# Patient Record
Sex: FEMALE | Race: WHITE | Employment: OTHER | URBAN - METROPOLITAN AREA
[De-identification: names, ages, dates, MRNs, and addresses within clinical notes are randomized per-mention and may not be internally consistent; named-entity substitution may affect disease eponyms.]

---

## 2018-04-13 ENCOUNTER — TELEPHONE (OUTPATIENT)
Dept: ORTHOPEDIC SURGERY | Age: 83
End: 2018-04-13

## 2019-02-06 ENCOUNTER — APPOINTMENT (OUTPATIENT)
Dept: CT IMAGING | Age: 84
End: 2019-02-06
Payer: MEDICARE

## 2019-02-06 ENCOUNTER — APPOINTMENT (OUTPATIENT)
Dept: GENERAL RADIOLOGY | Age: 84
End: 2019-02-06
Payer: MEDICARE

## 2019-02-06 ENCOUNTER — HOSPITAL ENCOUNTER (OUTPATIENT)
Age: 84
Setting detail: OBSERVATION
Discharge: HOME | End: 2019-02-08
Attending: EMERGENCY MEDICINE | Admitting: HOSPITALIST
Payer: MEDICARE

## 2019-02-06 DIAGNOSIS — G45.9 TIA (TRANSIENT ISCHEMIC ATTACK): Primary | ICD-10-CM

## 2019-02-06 LAB
A/G RATIO: 1.6 (ref 1.1–2.2)
ALBUMIN SERPL-MCNC: 4.5 G/DL (ref 3.4–5)
ALP BLD-CCNC: 64 U/L (ref 40–129)
ALT SERPL-CCNC: 10 U/L (ref 10–40)
ANION GAP SERPL CALCULATED.3IONS-SCNC: 15 MMOL/L (ref 3–16)
AST SERPL-CCNC: 18 U/L (ref 15–37)
BASOPHILS ABSOLUTE: 0 K/UL (ref 0–0.2)
BASOPHILS RELATIVE PERCENT: 0.8 %
BILIRUB SERPL-MCNC: 0.4 MG/DL (ref 0–1)
BILIRUBIN URINE: NEGATIVE
BLOOD, URINE: NEGATIVE
BUN BLDV-MCNC: 16 MG/DL (ref 7–20)
CALCIUM SERPL-MCNC: 9.5 MG/DL (ref 8.3–10.6)
CHLORIDE BLD-SCNC: 106 MMOL/L (ref 99–110)
CLARITY: CLEAR
CO2: 21 MMOL/L (ref 21–32)
COLOR: YELLOW
CREAT SERPL-MCNC: 0.8 MG/DL (ref 0.6–1.2)
EOSINOPHILS ABSOLUTE: 0.2 K/UL (ref 0–0.6)
EOSINOPHILS RELATIVE PERCENT: 3.9 %
GFR AFRICAN AMERICAN: >60
GFR NON-AFRICAN AMERICAN: >60
GLOBULIN: 2.9 G/DL
GLUCOSE BLD-MCNC: 104 MG/DL (ref 70–99)
GLUCOSE BLD-MCNC: 108 MG/DL (ref 70–99)
GLUCOSE URINE: NEGATIVE MG/DL
HCT VFR BLD CALC: 40.1 % (ref 36–48)
HEMOGLOBIN: 13.8 G/DL (ref 12–16)
KETONES, URINE: NEGATIVE MG/DL
LEUKOCYTE ESTERASE, URINE: NEGATIVE
LYMPHOCYTES ABSOLUTE: 1.6 K/UL (ref 1–5.1)
LYMPHOCYTES RELATIVE PERCENT: 27.3 %
MCH RBC QN AUTO: 29.5 PG (ref 26–34)
MCHC RBC AUTO-ENTMCNC: 34.4 G/DL (ref 31–36)
MCV RBC AUTO: 85.8 FL (ref 80–100)
MICROSCOPIC EXAMINATION: NORMAL
MONOCYTES ABSOLUTE: 0.4 K/UL (ref 0–1.3)
MONOCYTES RELATIVE PERCENT: 6 %
NEUTROPHILS ABSOLUTE: 3.6 K/UL (ref 1.7–7.7)
NEUTROPHILS RELATIVE PERCENT: 62 %
NITRITE, URINE: NEGATIVE
PDW BLD-RTO: 13.7 % (ref 12.4–15.4)
PERFORMED ON: ABNORMAL
PH UA: 7.5
PLATELET # BLD: 179 K/UL (ref 135–450)
PMV BLD AUTO: 8.6 FL (ref 5–10.5)
POTASSIUM SERPL-SCNC: 3.9 MMOL/L (ref 3.5–5.1)
PRO-BNP: 146 PG/ML (ref 0–449)
PROTEIN UA: NEGATIVE MG/DL
RBC # BLD: 4.68 M/UL (ref 4–5.2)
SODIUM BLD-SCNC: 142 MMOL/L (ref 136–145)
SPECIFIC GRAVITY UA: >1.03
TOTAL PROTEIN: 7.4 G/DL (ref 6.4–8.2)
TROPONIN: <0.01 NG/ML
URINE REFLEX TO CULTURE: NORMAL
URINE TYPE: NORMAL
UROBILINOGEN, URINE: 0.2 E.U./DL
WBC # BLD: 5.9 K/UL (ref 4–11)

## 2019-02-06 PROCEDURE — 2580000003 HC RX 258: Performed by: HOSPITALIST

## 2019-02-06 PROCEDURE — 83880 ASSAY OF NATRIURETIC PEPTIDE: CPT

## 2019-02-06 PROCEDURE — 71045 X-RAY EXAM CHEST 1 VIEW: CPT

## 2019-02-06 PROCEDURE — 70450 CT HEAD/BRAIN W/O DYE: CPT

## 2019-02-06 PROCEDURE — 36415 COLL VENOUS BLD VENIPUNCTURE: CPT

## 2019-02-06 PROCEDURE — 6360000004 HC RX CONTRAST MEDICATION: Performed by: EMERGENCY MEDICINE

## 2019-02-06 PROCEDURE — 85025 COMPLETE CBC W/AUTO DIFF WBC: CPT

## 2019-02-06 PROCEDURE — 6370000000 HC RX 637 (ALT 250 FOR IP): Performed by: HOSPITALIST

## 2019-02-06 PROCEDURE — G0378 HOSPITAL OBSERVATION PER HR: HCPCS

## 2019-02-06 PROCEDURE — 84484 ASSAY OF TROPONIN QUANT: CPT

## 2019-02-06 PROCEDURE — 70498 CT ANGIOGRAPHY NECK: CPT

## 2019-02-06 PROCEDURE — 80053 COMPREHEN METABOLIC PANEL: CPT

## 2019-02-06 PROCEDURE — 93005 ELECTROCARDIOGRAM TRACING: CPT | Performed by: EMERGENCY MEDICINE

## 2019-02-06 PROCEDURE — 81003 URINALYSIS AUTO W/O SCOPE: CPT

## 2019-02-06 PROCEDURE — 70496 CT ANGIOGRAPHY HEAD: CPT

## 2019-02-06 PROCEDURE — 99285 EMERGENCY DEPT VISIT HI MDM: CPT

## 2019-02-06 PROCEDURE — 93010 ELECTROCARDIOGRAM REPORT: CPT | Performed by: INTERNAL MEDICINE

## 2019-02-06 RX ORDER — SODIUM CHLORIDE 0.9 % (FLUSH) 0.9 %
10 SYRINGE (ML) INJECTION PRN
Status: DISCONTINUED | OUTPATIENT
Start: 2019-02-06 | End: 2019-02-08 | Stop reason: HOSPADM

## 2019-02-06 RX ORDER — ZOLPIDEM TARTRATE 5 MG/1
5 TABLET ORAL NIGHTLY PRN
Status: DISCONTINUED | OUTPATIENT
Start: 2019-02-06 | End: 2019-02-08 | Stop reason: HOSPADM

## 2019-02-06 RX ORDER — ALBUTEROL SULFATE 90 UG/1
2 AEROSOL, METERED RESPIRATORY (INHALATION) EVERY 6 HOURS PRN
Status: DISCONTINUED | OUTPATIENT
Start: 2019-02-06 | End: 2019-02-08 | Stop reason: HOSPADM

## 2019-02-06 RX ORDER — POLYETHYLENE GLYCOL 3350 17 G/17G
17 POWDER, FOR SOLUTION ORAL DAILY
Status: DISCONTINUED | OUTPATIENT
Start: 2019-02-06 | End: 2019-02-08 | Stop reason: HOSPADM

## 2019-02-06 RX ORDER — ASPIRIN 81 MG/1
81 TABLET ORAL DAILY
Status: DISCONTINUED | OUTPATIENT
Start: 2019-02-06 | End: 2019-02-06

## 2019-02-06 RX ORDER — SODIUM CHLORIDE 9 MG/ML
INJECTION, SOLUTION INTRAVENOUS CONTINUOUS
Status: DISCONTINUED | OUTPATIENT
Start: 2019-02-06 | End: 2019-02-07

## 2019-02-06 RX ORDER — ATORVASTATIN CALCIUM 40 MG/1
40 TABLET, FILM COATED ORAL NIGHTLY
Status: DISCONTINUED | OUTPATIENT
Start: 2019-02-06 | End: 2019-02-08 | Stop reason: HOSPADM

## 2019-02-06 RX ORDER — SODIUM CHLORIDE 0.9 % (FLUSH) 0.9 %
10 SYRINGE (ML) INJECTION EVERY 12 HOURS SCHEDULED
Status: DISCONTINUED | OUTPATIENT
Start: 2019-02-06 | End: 2019-02-08 | Stop reason: HOSPADM

## 2019-02-06 RX ORDER — M-VIT,TX,IRON,MINS/CALC/FOLIC 27MG-0.4MG
1 TABLET ORAL DAILY
Status: DISCONTINUED | OUTPATIENT
Start: 2019-02-06 | End: 2019-02-08 | Stop reason: HOSPADM

## 2019-02-06 RX ORDER — ERGOCALCIFEROL 1.25 MG/1
50000 CAPSULE ORAL
Status: DISCONTINUED | OUTPATIENT
Start: 2019-02-08 | End: 2019-02-08 | Stop reason: HOSPADM

## 2019-02-06 RX ORDER — ONDANSETRON 2 MG/ML
4 INJECTION INTRAMUSCULAR; INTRAVENOUS EVERY 6 HOURS PRN
Status: DISCONTINUED | OUTPATIENT
Start: 2019-02-06 | End: 2019-02-08 | Stop reason: HOSPADM

## 2019-02-06 RX ORDER — PANTOPRAZOLE SODIUM 40 MG/1
40 TABLET, DELAYED RELEASE ORAL
Status: DISCONTINUED | OUTPATIENT
Start: 2019-02-07 | End: 2019-02-08 | Stop reason: HOSPADM

## 2019-02-06 RX ORDER — OMEPRAZOLE 40 MG/1
CAPSULE, DELAYED RELEASE ORAL
Refills: 1 | COMMUNITY
Start: 2019-01-15

## 2019-02-06 RX ORDER — ACETAMINOPHEN, ASPIRIN AND CAFFEINE 250; 250; 65 MG/1; MG/1; MG/1
1 TABLET, FILM COATED ORAL DAILY
Status: DISCONTINUED | OUTPATIENT
Start: 2019-02-07 | End: 2019-02-07

## 2019-02-06 RX ADMIN — ZOLPIDEM TARTRATE 5 MG: 5 TABLET ORAL at 23:02

## 2019-02-06 RX ADMIN — IOPAMIDOL 75 ML: 755 INJECTION, SOLUTION INTRAVENOUS at 13:19

## 2019-02-06 RX ADMIN — SODIUM CHLORIDE: 9 INJECTION, SOLUTION INTRAVENOUS at 18:42

## 2019-02-06 ASSESSMENT — PAIN DESCRIPTION - DESCRIPTORS: DESCRIPTORS: NUMBNESS

## 2019-02-06 ASSESSMENT — PAIN DESCRIPTION - FREQUENCY
FREQUENCY: CONTINUOUS
FREQUENCY: CONTINUOUS

## 2019-02-06 ASSESSMENT — PAIN DESCRIPTION - PAIN TYPE
TYPE: ACUTE PAIN
TYPE: CHRONIC PAIN

## 2019-02-06 ASSESSMENT — PAIN DESCRIPTION - LOCATION
LOCATION: HEAD;FOOT
LOCATION: ARM;BACK;NECK

## 2019-02-06 ASSESSMENT — PAIN SCALES - GENERAL
PAINLEVEL_OUTOF10: 6
PAINLEVEL_OUTOF10: 5

## 2019-02-06 ASSESSMENT — PAIN DESCRIPTION - ORIENTATION
ORIENTATION: LEFT
ORIENTATION: LEFT

## 2019-02-07 ENCOUNTER — APPOINTMENT (OUTPATIENT)
Dept: MRI IMAGING | Age: 84
End: 2019-02-07
Payer: MEDICARE

## 2019-02-07 LAB
BASOPHILS ABSOLUTE: 0 K/UL (ref 0–0.2)
BASOPHILS RELATIVE PERCENT: 0.7 %
CHOLESTEROL, TOTAL: 205 MG/DL (ref 0–199)
EOSINOPHILS ABSOLUTE: 0.4 K/UL (ref 0–0.6)
EOSINOPHILS RELATIVE PERCENT: 8.9 %
ESTIMATED AVERAGE GLUCOSE: 114 MG/DL
GLUCOSE BLD-MCNC: 94 MG/DL (ref 70–99)
HBA1C MFR BLD: 5.6 %
HCT VFR BLD CALC: 35.4 % (ref 36–48)
HDLC SERPL-MCNC: 64 MG/DL (ref 40–60)
HEMOGLOBIN: 12 G/DL (ref 12–16)
LDL CHOLESTEROL CALCULATED: 128 MG/DL
LV EF: 58 %
LVEF MODALITY: NORMAL
LYMPHOCYTES ABSOLUTE: 2 K/UL (ref 1–5.1)
LYMPHOCYTES RELATIVE PERCENT: 42.2 %
MCH RBC QN AUTO: 29.5 PG (ref 26–34)
MCHC RBC AUTO-ENTMCNC: 33.8 G/DL (ref 31–36)
MCV RBC AUTO: 87.3 FL (ref 80–100)
MONOCYTES ABSOLUTE: 0.4 K/UL (ref 0–1.3)
MONOCYTES RELATIVE PERCENT: 9.5 %
NEUTROPHILS ABSOLUTE: 1.8 K/UL (ref 1.7–7.7)
NEUTROPHILS RELATIVE PERCENT: 38.7 %
PDW BLD-RTO: 13.2 % (ref 12.4–15.4)
PERFORMED ON: NORMAL
PLATELET # BLD: 153 K/UL (ref 135–450)
PMV BLD AUTO: 8.4 FL (ref 5–10.5)
RBC # BLD: 4.05 M/UL (ref 4–5.2)
TRIGL SERPL-MCNC: 67 MG/DL (ref 0–150)
VLDLC SERPL CALC-MCNC: 13 MG/DL
WBC # BLD: 4.7 K/UL (ref 4–11)

## 2019-02-07 PROCEDURE — 6360000004 HC RX CONTRAST MEDICATION: Performed by: HOSPITALIST

## 2019-02-07 PROCEDURE — C8929 TTE W OR WO FOL WCON,DOPPLER: HCPCS

## 2019-02-07 PROCEDURE — 94664 DEMO&/EVAL PT USE INHALER: CPT

## 2019-02-07 PROCEDURE — 97162 PT EVAL MOD COMPLEX 30 MIN: CPT | Performed by: PHYSICAL THERAPIST

## 2019-02-07 PROCEDURE — 72141 MRI NECK SPINE W/O DYE: CPT

## 2019-02-07 PROCEDURE — 94760 N-INVAS EAR/PLS OXIMETRY 1: CPT

## 2019-02-07 PROCEDURE — 80061 LIPID PANEL: CPT

## 2019-02-07 PROCEDURE — 70553 MRI BRAIN STEM W/O & W/DYE: CPT

## 2019-02-07 PROCEDURE — 97530 THERAPEUTIC ACTIVITIES: CPT | Performed by: PHYSICAL THERAPIST

## 2019-02-07 PROCEDURE — 83036 HEMOGLOBIN GLYCOSYLATED A1C: CPT

## 2019-02-07 PROCEDURE — 97530 THERAPEUTIC ACTIVITIES: CPT

## 2019-02-07 PROCEDURE — A9577 INJ MULTIHANCE: HCPCS | Performed by: HOSPITALIST

## 2019-02-07 PROCEDURE — 85025 COMPLETE CBC W/AUTO DIFF WBC: CPT

## 2019-02-07 PROCEDURE — G0378 HOSPITAL OBSERVATION PER HR: HCPCS

## 2019-02-07 PROCEDURE — 6370000000 HC RX 637 (ALT 250 FOR IP): Performed by: HOSPITALIST

## 2019-02-07 PROCEDURE — 97116 GAIT TRAINING THERAPY: CPT | Performed by: PHYSICAL THERAPIST

## 2019-02-07 PROCEDURE — 36415 COLL VENOUS BLD VENIPUNCTURE: CPT

## 2019-02-07 PROCEDURE — 2580000003 HC RX 258: Performed by: HOSPITALIST

## 2019-02-07 PROCEDURE — 97165 OT EVAL LOW COMPLEX 30 MIN: CPT

## 2019-02-07 RX ORDER — ACETAMINOPHEN, ASPIRIN AND CAFFEINE 250; 250; 65 MG/1; MG/1; MG/1
1 TABLET, FILM COATED ORAL 2 TIMES DAILY PRN
Status: DISCONTINUED | OUTPATIENT
Start: 2019-02-07 | End: 2019-02-08 | Stop reason: HOSPADM

## 2019-02-07 RX ORDER — LORAZEPAM 1 MG/1
1 TABLET ORAL ONCE
Status: COMPLETED | OUTPATIENT
Start: 2019-02-07 | End: 2019-02-07

## 2019-02-07 RX ORDER — LISINOPRIL 5 MG/1
5 TABLET ORAL DAILY
Status: DISCONTINUED | OUTPATIENT
Start: 2019-02-07 | End: 2019-02-08 | Stop reason: HOSPADM

## 2019-02-07 RX ADMIN — MULTIPLE VITAMINS W/ MINERALS TAB 1 TABLET: TAB at 10:02

## 2019-02-07 RX ADMIN — Medication 10 ML: at 21:20

## 2019-02-07 RX ADMIN — POLYETHYLENE GLYCOL 3350 17 G: 17 POWDER, FOR SOLUTION ORAL at 10:02

## 2019-02-07 RX ADMIN — SODIUM CHLORIDE: 9 INJECTION, SOLUTION INTRAVENOUS at 06:42

## 2019-02-07 RX ADMIN — ACETAMINOPHEN, ASPIRIN AND CAFFEINE 1 TABLET: 250; 250; 65 TABLET, FILM COATED ORAL at 11:26

## 2019-02-07 RX ADMIN — LORAZEPAM 1 MG: 1 TABLET ORAL at 14:39

## 2019-02-07 RX ADMIN — ACETAMINOPHEN, ASPIRIN AND CAFFEINE 1 TABLET: 250; 250; 65 TABLET, FILM COATED ORAL at 02:59

## 2019-02-07 RX ADMIN — GADOBENATE DIMEGLUMINE 10 ML: 529 INJECTION, SOLUTION INTRAVENOUS at 15:54

## 2019-02-07 RX ADMIN — ZOLPIDEM TARTRATE 5 MG: 5 TABLET ORAL at 23:24

## 2019-02-07 ASSESSMENT — PAIN SCALES - GENERAL
PAINLEVEL_OUTOF10: 5
PAINLEVEL_OUTOF10: 0
PAINLEVEL_OUTOF10: 6
PAINLEVEL_OUTOF10: 8
PAINLEVEL_OUTOF10: 8
PAINLEVEL_OUTOF10: 0
PAINLEVEL_OUTOF10: 4

## 2019-02-07 ASSESSMENT — PAIN DESCRIPTION - LOCATION: LOCATION: HEAD

## 2019-02-07 ASSESSMENT — PAIN DESCRIPTION - PAIN TYPE: TYPE: CHRONIC PAIN

## 2019-02-08 VITALS
WEIGHT: 128.31 LBS | HEART RATE: 55 BPM | BODY MASS INDEX: 25.87 KG/M2 | SYSTOLIC BLOOD PRESSURE: 159 MMHG | DIASTOLIC BLOOD PRESSURE: 83 MMHG | RESPIRATION RATE: 16 BRPM | HEIGHT: 59 IN | TEMPERATURE: 98 F | OXYGEN SATURATION: 94 %

## 2019-02-08 LAB
EKG ATRIAL RATE: 69 BPM
EKG DIAGNOSIS: NORMAL
EKG P AXIS: 44 DEGREES
EKG P-R INTERVAL: 154 MS
EKG Q-T INTERVAL: 392 MS
EKG QRS DURATION: 82 MS
EKG QTC CALCULATION (BAZETT): 420 MS
EKG R AXIS: 5 DEGREES
EKG T AXIS: 26 DEGREES
EKG VENTRICULAR RATE: 69 BPM

## 2019-02-08 PROCEDURE — G0378 HOSPITAL OBSERVATION PER HR: HCPCS

## 2019-02-08 PROCEDURE — 94760 N-INVAS EAR/PLS OXIMETRY 1: CPT

## 2019-02-08 PROCEDURE — 6370000000 HC RX 637 (ALT 250 FOR IP): Performed by: HOSPITALIST

## 2019-02-08 PROCEDURE — 2580000003 HC RX 258: Performed by: HOSPITALIST

## 2019-02-08 RX ORDER — LISINOPRIL 5 MG/1
5 TABLET ORAL DAILY
Qty: 30 TABLET | Refills: 0 | Status: ON HOLD | OUTPATIENT
Start: 2019-02-09 | End: 2021-12-10

## 2019-02-08 RX ORDER — ATORVASTATIN CALCIUM 40 MG/1
40 TABLET, FILM COATED ORAL NIGHTLY
Qty: 30 TABLET | Refills: 0 | Status: ON HOLD | OUTPATIENT
Start: 2019-02-08 | End: 2021-12-10

## 2019-02-08 RX ADMIN — ACETAMINOPHEN, ASPIRIN AND CAFFEINE 1 TABLET: 250; 250; 65 TABLET, FILM COATED ORAL at 06:41

## 2019-02-08 RX ADMIN — Medication 10 ML: at 07:54

## 2019-02-08 RX ADMIN — PANTOPRAZOLE SODIUM 40 MG: 40 TABLET, DELAYED RELEASE ORAL at 06:40

## 2019-02-08 RX ADMIN — MULTIPLE VITAMINS W/ MINERALS TAB 1 TABLET: TAB at 07:55

## 2019-02-08 RX ADMIN — POLYETHYLENE GLYCOL 3350 17 G: 17 POWDER, FOR SOLUTION ORAL at 07:55

## 2019-02-08 ASSESSMENT — PAIN DESCRIPTION - LOCATION
LOCATION: HEAD
LOCATION: HEAD;BACK;NECK

## 2019-02-08 ASSESSMENT — PAIN SCALES - GENERAL
PAINLEVEL_OUTOF10: 2
PAINLEVEL_OUTOF10: 4

## 2019-02-08 ASSESSMENT — PAIN DESCRIPTION - DESCRIPTORS: DESCRIPTORS: PRESSURE;ACHING

## 2019-02-08 ASSESSMENT — PAIN DESCRIPTION - FREQUENCY: FREQUENCY: INTERMITTENT

## 2019-02-08 ASSESSMENT — PAIN DESCRIPTION - PAIN TYPE
TYPE: ACUTE PAIN
TYPE: ACUTE PAIN;CHRONIC PAIN

## 2019-04-15 ENCOUNTER — HOSPITAL ENCOUNTER (EMERGENCY)
Age: 84
Discharge: HOME | End: 2019-04-15
Payer: MEDICARE

## 2019-04-15 ENCOUNTER — APPOINTMENT (OUTPATIENT)
Dept: CT IMAGING | Age: 84
End: 2019-04-15
Payer: MEDICARE

## 2019-04-15 VITALS
TEMPERATURE: 98.1 F | DIASTOLIC BLOOD PRESSURE: 88 MMHG | HEIGHT: 59 IN | BODY MASS INDEX: 25.82 KG/M2 | WEIGHT: 128.09 LBS | RESPIRATION RATE: 16 BRPM | OXYGEN SATURATION: 96 % | SYSTOLIC BLOOD PRESSURE: 144 MMHG | HEART RATE: 70 BPM

## 2019-04-15 DIAGNOSIS — I16.0 HYPERTENSIVE URGENCY: Primary | ICD-10-CM

## 2019-04-15 LAB
ANION GAP SERPL CALCULATED.3IONS-SCNC: 12 MMOL/L (ref 3–16)
BASOPHILS ABSOLUTE: 0 K/UL (ref 0–0.2)
BASOPHILS RELATIVE PERCENT: 0.8 %
BILIRUBIN URINE: NEGATIVE
BLOOD, URINE: NEGATIVE
BUN BLDV-MCNC: 16 MG/DL (ref 7–20)
CALCIUM SERPL-MCNC: 9.2 MG/DL (ref 8.3–10.6)
CHLORIDE BLD-SCNC: 107 MMOL/L (ref 99–110)
CLARITY: CLEAR
CO2: 23 MMOL/L (ref 21–32)
COLOR: YELLOW
CREAT SERPL-MCNC: 0.7 MG/DL (ref 0.6–1.2)
EOSINOPHILS ABSOLUTE: 0.4 K/UL (ref 0–0.6)
EOSINOPHILS RELATIVE PERCENT: 6.9 %
EPITHELIAL CELLS, UA: 1 /HPF (ref 0–5)
GFR AFRICAN AMERICAN: >60
GFR NON-AFRICAN AMERICAN: >60
GLUCOSE BLD-MCNC: 102 MG/DL (ref 70–99)
GLUCOSE URINE: NEGATIVE MG/DL
HCT VFR BLD CALC: 38.4 % (ref 36–48)
HEMOGLOBIN: 13 G/DL (ref 12–16)
HYALINE CASTS: 2 /LPF (ref 0–8)
KETONES, URINE: NEGATIVE MG/DL
LEUKOCYTE ESTERASE, URINE: ABNORMAL
LIPASE: 23 U/L (ref 13–60)
LYMPHOCYTES ABSOLUTE: 2.1 K/UL (ref 1–5.1)
LYMPHOCYTES RELATIVE PERCENT: 35.1 %
MCH RBC QN AUTO: 28.8 PG (ref 26–34)
MCHC RBC AUTO-ENTMCNC: 33.9 G/DL (ref 31–36)
MCV RBC AUTO: 85.1 FL (ref 80–100)
MICROSCOPIC EXAMINATION: YES
MONOCYTES ABSOLUTE: 0.5 K/UL (ref 0–1.3)
MONOCYTES RELATIVE PERCENT: 7.8 %
NEUTROPHILS ABSOLUTE: 3 K/UL (ref 1.7–7.7)
NEUTROPHILS RELATIVE PERCENT: 49.4 %
NITRITE, URINE: NEGATIVE
PDW BLD-RTO: 13.4 % (ref 12.4–15.4)
PH UA: 5.5 (ref 5–8)
PLATELET # BLD: 186 K/UL (ref 135–450)
PMV BLD AUTO: 8.6 FL (ref 5–10.5)
POTASSIUM REFLEX MAGNESIUM: 3.8 MMOL/L (ref 3.5–5.1)
PROTEIN UA: NEGATIVE MG/DL
RBC # BLD: 4.51 M/UL (ref 4–5.2)
RBC UA: 1 /HPF (ref 0–4)
SODIUM BLD-SCNC: 142 MMOL/L (ref 136–145)
SPECIFIC GRAVITY UA: 1.01 (ref 1–1.03)
TROPONIN: <0.01 NG/ML
URINE REFLEX TO CULTURE: YES
URINE TYPE: ABNORMAL
UROBILINOGEN, URINE: 0.2 E.U./DL
WBC # BLD: 6.1 K/UL (ref 4–11)
WBC UA: 3 /HPF (ref 0–5)

## 2019-04-15 PROCEDURE — 93005 ELECTROCARDIOGRAM TRACING: CPT | Performed by: EMERGENCY MEDICINE

## 2019-04-15 PROCEDURE — 83690 ASSAY OF LIPASE: CPT

## 2019-04-15 PROCEDURE — 84484 ASSAY OF TROPONIN QUANT: CPT

## 2019-04-15 PROCEDURE — 6370000000 HC RX 637 (ALT 250 FOR IP): Performed by: PHYSICIAN ASSISTANT

## 2019-04-15 PROCEDURE — 85025 COMPLETE CBC W/AUTO DIFF WBC: CPT

## 2019-04-15 PROCEDURE — 99284 EMERGENCY DEPT VISIT MOD MDM: CPT

## 2019-04-15 PROCEDURE — 87086 URINE CULTURE/COLONY COUNT: CPT

## 2019-04-15 PROCEDURE — 70450 CT HEAD/BRAIN W/O DYE: CPT

## 2019-04-15 PROCEDURE — 80048 BASIC METABOLIC PNL TOTAL CA: CPT

## 2019-04-15 PROCEDURE — 81001 URINALYSIS AUTO W/SCOPE: CPT

## 2019-04-15 RX ORDER — TETRACAINE HYDROCHLORIDE 5 MG/ML
2 SOLUTION OPHTHALMIC ONCE
Status: COMPLETED | OUTPATIENT
Start: 2019-04-15 | End: 2019-04-15

## 2019-04-15 RX ADMIN — TETRACAINE HYDROCHLORIDE 2 DROP: 5 SOLUTION OPHTHALMIC at 22:07

## 2019-04-15 ASSESSMENT — PAIN SCALES - GENERAL
PAINLEVEL_OUTOF10: 0
PAINLEVEL_OUTOF10: 0

## 2019-04-15 NOTE — ED PROVIDER NOTES
Physician-In-Triage Note     As physician-in-triage, I performed a medical screening examination and evaluated this patient briefly with the purpose of initiating their ED workup in an expeditious manner. Please see notes from other ED providers regarding comprehensive evaluation including full history, physical exam, interpretation of results, and medical decision making/disposition. HISTORY OF PRESENT ILLNESS  Tenny Brittle is a 82 y.o. female resents the ED for evaluation of blurriness in her left eye. Patient was seen by her H EMT today who sent her to the ED because he is concerned she may have had a stroke. ENT requested that a CT of the head be performed. Patient states that she has been having blurriness in her left eye which started the previous morning when she woke up. Patient denies eye pain but states she has sensation that she is squinting. Patient denies lateral or superior hemianopsia. Patient states that she has had similar symptoms before when her blood pressure that uncontrolled. Patient states that she has not been taking her blood pressure medications for the last few days. On presentation patient is noted to have a systolic blood pressure over 200. Patient states that she did take half of a antihypertensive at her age into his office. Patient states that she thinks her symptoms will improve once her blood pressure is under control. Patient denies headache fevers nausea vomiting I trauma pain with eye movements, conjunctival injection, facial pain chest pain source of breath or abdominal pain. On presentation patient appears to be no acute distress is answering question appropriately.        PHYSICAL EXAM  ED Triage Vitals [04/15/19 1651]   BP Temp Temp Source Pulse Resp SpO2 Height Weight   (!) 204/106 97.8 °F (36.6 °C) Oral 74 16 97 % 4' 11\" (1.499 m) 128 lb 1.4 oz (58.1 kg)       Focused Physical exam:   Physical Exam   Constitutional: She is oriented to person, place, and time. She appears well-developed and well-nourished. No distress. HENT:   Head: Normocephalic and atraumatic. Mouth/Throat: Oropharynx is clear and moist.   Eyes: Pupils are equal, round, and reactive to light. Conjunctivae, EOM and lids are normal. Right eye exhibits no discharge. Left eye exhibits no discharge. Right eye exhibits normal extraocular motion. Left eye exhibits normal extraocular motion. Neck: Normal range of motion. Neck supple. Cardiovascular: Normal rate and regular rhythm. Pulmonary/Chest: No stridor. No respiratory distress. She has no wheezes. Abdominal: She exhibits no distension. There is no tenderness. Musculoskeletal: Normal range of motion. She exhibits no edema or tenderness. Neurological: She is oriented to person, place, and time. No cranial nerve deficit or sensory deficit. She exhibits normal muscle tone. Coordination normal.   Skin: Capillary refill takes less than 2 seconds. No erythema. Nursing note and vitals reviewed.           Plan: CBC BMP troponin EKG, CT of the head. Patient is able to count the number of digits with her left eye and the right eye is closed and does not have loss of vision. Triage orders placed. Treat any emergent laboratory abnormalities. CT of the head did not demonstrate any acute intracranial abnormalities. Was insistent that she thought her changes in vision would improve once her blood pressures improved. Intraocular pressures were checked and were within normal limits. Patient's blood pressure did improve without intervention. Patient is amenable to discharge home with outpatient follow-up and states that she will be apparent to her medication plan for hypertension. The risks of having uncontrolled hypertension were discussed with the patient. I agree with the SANDI plan of care. Please SANDI Note for full ED course and final disposition.       Sam Portillo MD  04/15/19 1044 [address], [place]  04/16/19 8997

## 2019-04-15 NOTE — ED NOTES
Dr Zach Walters evaluated pt, ok to go to waiting room until room available.       Prakash Rousseau RN  04/15/19 7488

## 2019-04-16 LAB
EKG ATRIAL RATE: 65 BPM
EKG DIAGNOSIS: NORMAL
EKG P AXIS: 48 DEGREES
EKG P-R INTERVAL: 142 MS
EKG Q-T INTERVAL: 398 MS
EKG QRS DURATION: 86 MS
EKG QTC CALCULATION (BAZETT): 413 MS
EKG R AXIS: 5 DEGREES
EKG T AXIS: 23 DEGREES
EKG VENTRICULAR RATE: 65 BPM

## 2019-04-16 PROCEDURE — 93010 ELECTROCARDIOGRAM REPORT: CPT | Performed by: INTERNAL MEDICINE

## 2019-04-16 NOTE — ED PROVIDER NOTES
[address]  eMERGENCY dEPARTMENT eNCOUnter      Pt Name: Giulia Oliver  MRN: [id number]  Alondra 1935  Date of evaluation: 4/15/2019  Provider: Dandre Casillas Dr       Chief Complaint   Patient presents with    Eye Problem     sent over by pcp to rule out stroke. reports unable to see out of left eye. blurred. reports a \"weird feeling on left side of face\"         HISTORY OF PRESENT ILLNESS  (Location/Symptom, Timing/Onset, Context/Setting, Quality, Duration, Modifying Factors, Severity.)   Giulia Oliver is a 82 y.o. female who presents to the emergency department with the complaint of visual disturbance in the left eye, high blood pressure readings, sore and itchy tongue. The patient says she was sent here from her doctor's office with concern for a stroke. She says her symptoms have been going on for a few days, and it feels like her left eye is closing on her and she has extreme blurriness in the left eye vision. She denies any trauma. She says she has a mild headache. Denies ear pain, nasal congestion, sore throat, fever or cold symptoms. She denies cough, shortness of breath or chest pain. She does report that she had high blood pressure reading at her doctor's office, and she has not taken her blood pressure medication in a couple of days, because in the past her blood pressure gotten too low and she didn't think she needed it. She says is very normally, denies nausea, vomiting or abdominal pain. No other complaints. Nursing Notes were reviewed and I agree. REVIEW OF SYSTEMS    (2-9 systems for level 4, 10 or more for level 5)     Constitutional:  Negative for fever, chills, appetite change, fatigue and unexpected weight change. HENT:  Positive for sensation of tongue swelling and itchiness. Negative for congestion, ear pain, facial swelling, rhinorrhea, sinus pressure, sneezing, sore throat and trouble swallowing.     Eyes:  Positive left eye visual disturbance, eyelid dysfunction. Negative for photophobia, pain. Respiratory:  Negative for cough, shortness of breath, wheezing and stridor. Cardiovascular:  Negative for chest pain, palpitations and leg swelling. Gastrointestinal:  Negative for nausea, vomiting, abdominal pain, diarrhea, constipation and blood in stool. Genitourinary:  Negative for dysuria, urgency, hematuria, flank pain, vaginal bleeding, vaginal discharge and pelvic pain. Musculoskeletal:  Negative for myalgias, arthralgias, neck pain and neck stiffness. Neurological:  Positive for headache. Negative for dizziness, seizures, syncope, speech difficulty, weakness, light-headedness, numbness. Psychiatric/Behavioral:  Negative for suicidal ideas, hallucinations, confusion, sleep disturbance and agitation. Except as noted above the remainder of the review of systems was reviewed and negative.        PAST MEDICAL HISTORY         Diagnosis Date    Anesthesia complication     states under general anesthesia has trouble awaking    Arthritis     fibramyalgia, osteo, DJD    Cancer ([facility] [address]     skin cancer removed from left arm    GERD (gastroesophageal reflux disease)     Nausea & vomiting     Psoriasis     scalp, front of legs and back of arms    Unspecified cerebral artery occlusion with cerebral infarction 5 years ago    several TIA       SURGICAL HISTORY           Procedure Laterality Date    BUNIONECTOMY Right     EXAM UNDER GENERAL ANESTHESIA OF NASOPHARYNX      HAND SURGERY Right     related to arthritis    NOSE SURGERY  4 years ago    1) nasal fracture and 2nd) fungal infection removed    SKIN CANCER EXCISION Left 2014       CURRENT MEDICATIONS       Discharge Medication List as of 4/15/2019  9:56 PM      CONTINUE these medications which have NOT CHANGED    Details   atorvastatin (LIPITOR) 40 MG tablet Take 1 tablet by mouth nightly, Disp-30 tablet, R-0Print      lisinopril (PRINIVIL;ZESTRIL) 5 MG tablet Take 1 tablet by mouth daily, Disp-30 tablet, R-0Print      omeprazole (PRILOSEC) 40 MG delayed release capsule take one capsule by mouth daily, R-1Historical Med      vitamin D (ERGOCALCIFEROL) 07434 units CAPS capsule Take 1 capsule by mouth once a week FridayHistorical Med      albuterol sulfate  (90 Base) MCG/ACT inhaler Inhale 2 puffs into the lungs every 6 hours as needed for Shortness of Breath Historical Med      terconazole (TERAZOL 7) 0.4 % vaginal cream Place 1 applicator vaginally as needed (irritation) Historical Med      aspirin-acetaminophen-caffeine (EXCEDRIN EXTRA STRENGTH) 250-250-65 MG per tablet Take 1 tablet by mouth 2 times daily Historical Med      Multiple Vitamins-Minerals (THERAPEUTIC MULTIVITAMIN-MINERALS) tablet Take 1 tablet by mouth daily. ALLERGIES     Sulfa antibiotics and Morphine    FAMILY HISTORY           Problem Relation Age of Onset    Asthma Mother     Heart Disease Mother     Cancer Mother     Heart Disease Father     Early Death Brother     Cancer Brother         esophogeal cancer    Asthma Brother     Heart Disease Brother      Family Status   Relation Name Status    Mother      Father      Brother      Brother          SOCIAL HISTORY      reports that she has quit smoking. She has never used smokeless tobacco. She reports that she does not drink alcohol or use drugs. PHYSICAL EXAM    (up to 7 for level 4, 8 or more for level 5)     ED Triage Vitals [04/15/19 1651]   BP Temp Temp Source Pulse Resp SpO2 Height Weight   (!) 204/106 97.8 °F (36.6 °C) Oral 74 16 97 % 4' 11\" (1.499 m) 128 lb 1.4 oz (58.1 kg)       Constitutional:  Appearing well-developed and well-nourished. No distress. HENT:  Normocephalic and atraumatic. Conjunctivae and EOM are normal.  Eyelid movement normal, negative for hemanopsia. Pupils are equal, round, and reactive to light. Neck:  Normal range of motion. Neck supple.  No tracheal deviation present. No thyromegaly present. No cervical adenopathy. Cardiovascular:  Normal rate, regular rhythm, normal heart sounds and intact distal pulses. Pulmonary/Chest:  Effort normal and breath sounds normal. No respiratory distress. No wheezes or rales. Abdominal:  Soft. Bowel sounds are normal. No distension, mass, tenderness, rebound or guarding. Musculoskeletal:  Normal range of motion. No edema exhibited. Neurological:  Alert and oriented to person, place, and time. No cranial nerve deficit. Skin:  Skin is warm and dry. Not diaphoretic. Psychiatric:  Normal mood, affect, behavior, judgment and thought content. DIAGNOSTIC RESULTS     IOP:  Right: 15, confidence 95. Left: 12, confidence 95. RADIOLOGY:     Interpretation per the Radiologist below, if available at the time of this note:    CT Head WO Contrast   Final Result   1. No acute intracranial abnormality or significant interval change from   prior study 02/06/2019.   2. Stable left parietal craniectomy changes.              LABS:  Labs Reviewed   BASIC METABOLIC PANEL W/ REFLEX TO MG FOR LOW K - Abnormal; Notable for the following components:       Result Value    Glucose 102 (*)     All other components within normal limits    Narrative:     Performed at:  [address] [employer] [address]   Phone [phone]   URINE RT REFLEX TO CULTURE - Abnormal; Notable for the following components:    Leukocyte Esterase, Urine TRACE (*)     All other components within normal limits    Narrative:     Performed at:  [facility]  [address] [facility] [employer] [address]   Phone [phone]   URINE CULTURE   CBC WITH AUTO DIFFERENTIAL    Narrative:     Performed at:  [facility]  [address] [facility] [employer] [address]   Phone [phone]   TROPONIN    Narrative:     Performed at:  [facility]  [address] [place] [address]   Phone [phone]   LIPASE    Narrative:     Performed at:  [facility]  [address] [place] [address]   Phone [phone]   MICROSCOPIC URINALYSIS    Narrative:     Performed at:  [facility]  [address] [place] [address]   Phone [phone]       All other labs were within normal range or not returned as of this dictation. EMERGENCY DEPARTMENT COURSE and DIFFERENTIAL DIAGNOSIS/MDM:   Vitals:    Vitals:    04/15/19 1651 04/15/19 1924 04/15/19 2206   BP: (!) 204/106 (!) 182/94 (!) 144/88   Pulse: 74 76 70   Resp: 16 16 16   Temp: 97.8 °F (36.6 °C)  98.1 °F (36.7 °C)   TempSrc: Oral  Oral   SpO2: 97% 96%    Weight: 128 lb 1.4 oz (58.1 kg)     Height: 4' 11\" (1.499 m)         The patient's condition in the ED was good, the patient was afebrile and nontoxic in appearance, and the patient's physical exam was unremarkable. There were no neurological deficits on exam, negative for facial droop or eyelid drooping, with normal eye movement. Patient reportedly had very poor visual acuity in the left eye at her doctor's office earlier today, improved visual acuity here today. Normal intraocular pressure bilaterally. EKG was not concerning. CT scan of the head without contrast showed no acute abnormality. Laboratory results showed no notable abnormalities, including a negative troponin, normal kidney function. Patient did present today with a blood pressure 204/106, blood pressure improved in the ED without intervention, down to 144/88. Suspicion for acute cardiac event or CVA was very low. Patient's symptoms are likely the result of poorly controlled blood pressure. There was no indication for hospitalization or further workup.   She'll be discharged with instructions to resume taking her blood pressure medication as prescribed and follow up promptly with her family doctor to discuss blood pressure control. The patient verbalized understanding and agreement with this plan of care. The patient was advised to return to the emergency department if symptoms should significantly worsen or if new and concerning symptoms should appear. ED attending physician Dr. Leonarda Alcala evaluated the patient personally and agrees with this plan of care. I estimate there is LOW risk for PULMONARY EMBOLISM, ACUTE CORONARY SYNDROME, THORACIC AORTIC DISSECTION, STROKE, TRANSIENT ISCHEMIC ATTACK, HEMORRHAGE, OR CARDIAC ARRHYTHMIA, thus I consider the discharge disposition reasonable. PROCEDURES:  None    FINAL IMPRESSION      1. Hypertensive urgency          DISPOSITION/PLAN   DISPOSITION Decision To Discharge 04/15/2019 09:43:01 PM      PATIENT REFERRED TO:  Savage Baez.  [id number]  [address] [phone]    Schedule an appointment as soon as possible for a visit   For follow-up care      DISCHARGE MEDICATIONS:  Discharge Medication List as of 4/15/2019  9:56 PM          (Please note that portions of this note were completed with a voice recognition program.  Efforts were made to edit the dictations but occasionally words are mis-transcribed.)    Nomi Browne, [address] [place] [address]  04/16/19 8513

## 2019-04-17 LAB — URINE CULTURE, ROUTINE: NORMAL

## 2019-07-02 ENCOUNTER — APPOINTMENT (OUTPATIENT)
Dept: GENERAL RADIOLOGY | Age: 84
End: 2019-07-02
Payer: MEDICARE

## 2019-07-02 ENCOUNTER — HOSPITAL ENCOUNTER (EMERGENCY)
Age: 84
Discharge: LEFT BEFORE DISCHARGE | End: 2019-07-02
Attending: EMERGENCY MEDICINE
Payer: MEDICARE

## 2019-07-02 VITALS
TEMPERATURE: 98 F | OXYGEN SATURATION: 97 % | SYSTOLIC BLOOD PRESSURE: 150 MMHG | BODY MASS INDEX: 23 KG/M2 | HEART RATE: 96 BPM | WEIGHT: 134.7 LBS | DIASTOLIC BLOOD PRESSURE: 104 MMHG | RESPIRATION RATE: 18 BRPM | HEIGHT: 64 IN

## 2019-07-02 DIAGNOSIS — I16.0 HYPERTENSIVE URGENCY: Primary | ICD-10-CM

## 2019-07-02 DIAGNOSIS — R51.9 NONINTRACTABLE EPISODIC HEADACHE, UNSPECIFIED HEADACHE TYPE: ICD-10-CM

## 2019-07-02 DIAGNOSIS — R07.9 CHEST PAIN, UNSPECIFIED TYPE: ICD-10-CM

## 2019-07-02 LAB
ANION GAP SERPL CALCULATED.3IONS-SCNC: 12 MMOL/L (ref 3–16)
BASOPHILS ABSOLUTE: 0 K/UL (ref 0–0.2)
BASOPHILS RELATIVE PERCENT: 0.7 %
BILIRUBIN URINE: NEGATIVE
BLOOD, URINE: ABNORMAL
BUN BLDV-MCNC: 20 MG/DL (ref 7–20)
CALCIUM SERPL-MCNC: 10.1 MG/DL (ref 8.3–10.6)
CHLORIDE BLD-SCNC: 107 MMOL/L (ref 99–110)
CLARITY: CLEAR
CO2: 21 MMOL/L (ref 21–32)
COLOR: YELLOW
CREAT SERPL-MCNC: 0.8 MG/DL (ref 0.6–1.2)
EKG ATRIAL RATE: 92 BPM
EKG DIAGNOSIS: NORMAL
EKG P AXIS: 59 DEGREES
EKG P-R INTERVAL: 160 MS
EKG Q-T INTERVAL: 354 MS
EKG QRS DURATION: 80 MS
EKG QTC CALCULATION (BAZETT): 437 MS
EKG R AXIS: 8 DEGREES
EKG T AXIS: 43 DEGREES
EKG VENTRICULAR RATE: 92 BPM
EOSINOPHILS ABSOLUTE: 0.4 K/UL (ref 0–0.6)
EOSINOPHILS RELATIVE PERCENT: 5.3 %
EPITHELIAL CELLS, UA: NORMAL /HPF
GFR AFRICAN AMERICAN: >60
GFR NON-AFRICAN AMERICAN: >60
GLUCOSE BLD-MCNC: 121 MG/DL (ref 70–99)
GLUCOSE URINE: NEGATIVE MG/DL
HCT VFR BLD CALC: 39.6 % (ref 36–48)
HEMOGLOBIN: 13.1 G/DL (ref 12–16)
KETONES, URINE: NEGATIVE MG/DL
LEUKOCYTE ESTERASE, URINE: NEGATIVE
LYMPHOCYTES ABSOLUTE: 1.3 K/UL (ref 1–5.1)
LYMPHOCYTES RELATIVE PERCENT: 17.5 %
MCH RBC QN AUTO: 28.5 PG (ref 26–34)
MCHC RBC AUTO-ENTMCNC: 33.2 G/DL (ref 31–36)
MCV RBC AUTO: 86 FL (ref 80–100)
MICROSCOPIC EXAMINATION: YES
MONOCYTES ABSOLUTE: 0.5 K/UL (ref 0–1.3)
MONOCYTES RELATIVE PERCENT: 6.2 %
NEUTROPHILS ABSOLUTE: 5.1 K/UL (ref 1.7–7.7)
NEUTROPHILS RELATIVE PERCENT: 70.3 %
NITRITE, URINE: NEGATIVE
PDW BLD-RTO: 13.9 % (ref 12.4–15.4)
PH UA: 6 (ref 5–8)
PLATELET # BLD: 196 K/UL (ref 135–450)
PMV BLD AUTO: 9.2 FL (ref 5–10.5)
POTASSIUM SERPL-SCNC: 4 MMOL/L (ref 3.5–5.1)
PROTEIN UA: NEGATIVE MG/DL
RBC # BLD: 4.6 M/UL (ref 4–5.2)
RBC UA: NORMAL /HPF (ref 0–2)
SODIUM BLD-SCNC: 140 MMOL/L (ref 136–145)
SPECIFIC GRAVITY UA: <=1.005 (ref 1–1.03)
TROPONIN: <0.01 NG/ML
URINE REFLEX TO CULTURE: ABNORMAL
URINE TYPE: ABNORMAL
UROBILINOGEN, URINE: 0.2 E.U./DL
WBC # BLD: 7.3 K/UL (ref 4–11)
WBC UA: NORMAL /HPF (ref 0–5)

## 2019-07-02 PROCEDURE — 6370000000 HC RX 637 (ALT 250 FOR IP): Performed by: EMERGENCY MEDICINE

## 2019-07-02 PROCEDURE — 71046 X-RAY EXAM CHEST 2 VIEWS: CPT

## 2019-07-02 PROCEDURE — 80048 BASIC METABOLIC PNL TOTAL CA: CPT

## 2019-07-02 PROCEDURE — 96361 HYDRATE IV INFUSION ADD-ON: CPT

## 2019-07-02 PROCEDURE — 93005 ELECTROCARDIOGRAM TRACING: CPT | Performed by: EMERGENCY MEDICINE

## 2019-07-02 PROCEDURE — 99284 EMERGENCY DEPT VISIT MOD MDM: CPT

## 2019-07-02 PROCEDURE — 36415 COLL VENOUS BLD VENIPUNCTURE: CPT

## 2019-07-02 PROCEDURE — 84484 ASSAY OF TROPONIN QUANT: CPT

## 2019-07-02 PROCEDURE — 6360000002 HC RX W HCPCS: Performed by: EMERGENCY MEDICINE

## 2019-07-02 PROCEDURE — 81001 URINALYSIS AUTO W/SCOPE: CPT

## 2019-07-02 PROCEDURE — 93010 ELECTROCARDIOGRAM REPORT: CPT | Performed by: INTERNAL MEDICINE

## 2019-07-02 PROCEDURE — 2580000003 HC RX 258: Performed by: EMERGENCY MEDICINE

## 2019-07-02 PROCEDURE — 96375 TX/PRO/DX INJ NEW DRUG ADDON: CPT

## 2019-07-02 PROCEDURE — 96374 THER/PROPH/DIAG INJ IV PUSH: CPT

## 2019-07-02 PROCEDURE — 85025 COMPLETE CBC W/AUTO DIFF WBC: CPT

## 2019-07-02 RX ORDER — DIPHENHYDRAMINE HYDROCHLORIDE 50 MG/ML
25 INJECTION INTRAMUSCULAR; INTRAVENOUS ONCE
Status: COMPLETED | OUTPATIENT
Start: 2019-07-02 | End: 2019-07-02

## 2019-07-02 RX ORDER — LORAZEPAM 1 MG/1
1 TABLET ORAL ONCE
Status: COMPLETED | OUTPATIENT
Start: 2019-07-02 | End: 2019-07-02

## 2019-07-02 RX ORDER — 0.9 % SODIUM CHLORIDE 0.9 %
1000 INTRAVENOUS SOLUTION INTRAVENOUS ONCE
Status: COMPLETED | OUTPATIENT
Start: 2019-07-02 | End: 2019-07-02

## 2019-07-02 RX ORDER — METOCLOPRAMIDE HYDROCHLORIDE 5 MG/ML
10 INJECTION INTRAMUSCULAR; INTRAVENOUS ONCE
Status: COMPLETED | OUTPATIENT
Start: 2019-07-02 | End: 2019-07-02

## 2019-07-02 RX ADMIN — LORAZEPAM 1 MG: 1 TABLET ORAL at 08:39

## 2019-07-02 RX ADMIN — METOCLOPRAMIDE 10 MG: 5 INJECTION, SOLUTION INTRAMUSCULAR; INTRAVENOUS at 09:13

## 2019-07-02 RX ADMIN — DIPHENHYDRAMINE HYDROCHLORIDE 25 MG: 50 INJECTION, SOLUTION INTRAMUSCULAR; INTRAVENOUS at 09:08

## 2019-07-02 RX ADMIN — SODIUM CHLORIDE 1000 ML: 9 INJECTION, SOLUTION INTRAVENOUS at 09:06

## 2019-07-02 ASSESSMENT — ENCOUNTER SYMPTOMS
STRIDOR: 0
SHORTNESS OF BREATH: 0
TROUBLE SWALLOWING: 0
COLOR CHANGE: 0
ABDOMINAL PAIN: 0
WHEEZING: 0
FACIAL SWELLING: 0
NAUSEA: 0
VOICE CHANGE: 0
VOMITING: 0

## 2019-07-02 ASSESSMENT — PAIN SCALES - GENERAL: PAINLEVEL_OUTOF10: 7

## 2019-07-02 ASSESSMENT — PAIN DESCRIPTION - LOCATION: LOCATION: HEAD

## 2019-07-02 ASSESSMENT — PAIN DESCRIPTION - DESCRIPTORS: DESCRIPTORS: ACHING

## 2019-07-02 ASSESSMENT — HEART SCORE: ECG: 0

## 2019-07-02 ASSESSMENT — PAIN DESCRIPTION - ORIENTATION: ORIENTATION: ANTERIOR

## 2019-07-02 ASSESSMENT — PAIN DESCRIPTION - PAIN TYPE: TYPE: ACUTE PAIN

## 2019-07-02 NOTE — ED PROVIDER NOTES
[address] [facility]  eMERGENCY dEPARTMENT eNCOUnter      Pt Name: Darol Boeck  MRN: [id number]  Armstrongfmaria a 1935  Date of evaluation: 7/2/2019  Provider: Tania Barton MD    CHIEF COMPLAINT       Chief Complaint   Patient presents with    Hypertension     pt staters past few days has noticed blood pressure becoming higher  takes pressure at home  c/o headache today and nausea    Chest Injury     pt c/o lf sided chest pain 2 episodes since yesterday    Dysuria     pt states started with urinary infection past week          HISTORY OF PRESENT ILLNESS   (Location/Symptom, Timing/Onset, Context/Setting, Quality, Duration, Modifying Factors, Severity)  Note limiting factors. Darol Boeck is a 82 y.o. female with hx of hypertension, chronic headaches, and reports of CVA who presents for uncontrolled hypertension as well as 2 episodes of chest pain. The patient reports that her blood pressure has been higher over the past 2 to 3 days despite her taking her medications. She reports that she has also had dysuria but was recently diagnosed with a E. coli urinary tract infection and is on Macrobid for this. She reports 2 episodes of substernal pressure chest pain which both lasted approximately 15 minutes and resolved one last night and one earlier this morning. She denies any diaphoresis, or vomiting but does report nausea. She reports her headache is similar to her multiple previous headaches with no worsening. Denies any syncope, neck stiffness, neurologic deficits. HPI    Nursing Notes were reviewed. REVIEW OFSYSTEMS    (2-9 systems for level 4, 10 or more for level 5)     Review of Systems   Constitutional: Positive for fatigue. Negative for appetite change, fever and unexpected weight change. HENT: Negative for facial swelling, trouble swallowing and voice change. Eyes: Negative for visual disturbance.    Respiratory: Negative for shortness of breath, wheezing major adverse cardiac event and supports observation with repeated troponins and/or non-invasive testing    Patient has no neurologic deficits I do not suspect CVA. I given the patient a migraine cocktail with improvement in headache and substantial improvement in blood pressure. Initial troponin is undetectable and EKG does not show obvious ischemia however patient is 84, had 2 episodes of chest pain with hypertension, and has a heart score of 4 therefore I recommended admission for ACS rule out and further monitoring. The patient refuses this stating that she has to take care of her pets and elderly . The patient as decided to leave against medical advice. The patient is awake and alert, non-intoxicated, non-suicidal, nonpsychotic. There is no medical condition that prevents or inhibits their understanding of the risks/benefits of their decision. The patient understands the risks and benefits of their decision and has been given the opportunity to ask questions about their medical condition. Procedures    FINAL IMPRESSION      1. Hypertensive urgency    2. Chest pain, unspecified type    3. Nonintractable episodic headache, unspecified headache type          DISPOSITION/PLAN   DISPOSITION [place] 07/02/2019 10:41:29 AM      PATIENT REFERRED TO:  Ragini Guillaume. Chandler Osuna, [address] [facility] [address]  [address]    In 3 days      [address]  [phone]    If symptoms worsen      (Please note that portions of this note were completed with a voice recognition program.  Efforts were made to edit the dictations but occasionally words aremis-transcribed. )    Mynor Harris MD (electronically signed)  Attending Emergency Physician           Mynor Harris MD  07/02/19 7619

## 2019-11-09 ENCOUNTER — HOSPITAL ENCOUNTER (EMERGENCY)
Age: 84
Discharge: HOME | End: 2019-11-09
Attending: EMERGENCY MEDICINE
Payer: MEDICARE

## 2019-11-09 VITALS
WEIGHT: 128 LBS | SYSTOLIC BLOOD PRESSURE: 166 MMHG | OXYGEN SATURATION: 97 % | TEMPERATURE: 97.7 F | BODY MASS INDEX: 25.8 KG/M2 | HEART RATE: 102 BPM | DIASTOLIC BLOOD PRESSURE: 97 MMHG | RESPIRATION RATE: 12 BRPM | HEIGHT: 59 IN

## 2019-11-09 DIAGNOSIS — K56.41 FECAL IMPACTION IN RECTUM (HCC): ICD-10-CM

## 2019-11-09 DIAGNOSIS — K59.00 CONSTIPATION, UNSPECIFIED CONSTIPATION TYPE: Primary | ICD-10-CM

## 2019-11-09 PROCEDURE — 99283 EMERGENCY DEPT VISIT LOW MDM: CPT

## 2019-11-09 PROCEDURE — 6370000000 HC RX 637 (ALT 250 FOR IP): Performed by: EMERGENCY MEDICINE

## 2019-11-09 RX ORDER — SODIUM PHOSPHATE, DIBASIC AND SODIUM PHOSPHATE, MONOBASIC 7; 19 G/133ML; G/133ML
1 ENEMA RECTAL
Status: DISCONTINUED | OUTPATIENT
Start: 2019-11-09 | End: 2019-11-09 | Stop reason: HOSPADM

## 2019-11-09 RX ORDER — POLYETHYLENE GLYCOL 3350 17 G/17G
17 POWDER, FOR SOLUTION ORAL DAILY
Qty: 1 BOTTLE | Refills: 0 | Status: SHIPPED | OUTPATIENT
Start: 2019-11-09 | End: 2019-11-16

## 2019-11-09 RX ORDER — DOCUSATE SODIUM 100 MG/1
100 CAPSULE, LIQUID FILLED ORAL 2 TIMES DAILY
Qty: 30 CAPSULE | Refills: 0 | Status: SHIPPED | OUTPATIENT
Start: 2019-11-09

## 2019-11-09 RX ADMIN — MAGNESIUM CITRATE 296 ML: 1.75 LIQUID ORAL at 12:49

## 2019-11-09 ASSESSMENT — PAIN SCALES - GENERAL
PAINLEVEL_OUTOF10: 4
PAINLEVEL_OUTOF10: 8

## 2019-11-09 ASSESSMENT — PAIN DESCRIPTION - DESCRIPTORS: DESCRIPTORS: BURNING

## 2019-11-09 ASSESSMENT — PAIN DESCRIPTION - PAIN TYPE
TYPE: ACUTE PAIN
TYPE: ACUTE PAIN

## 2019-11-09 ASSESSMENT — PAIN DESCRIPTION - LOCATION
LOCATION: RECTUM
LOCATION: ABDOMEN

## 2021-03-02 ENCOUNTER — APPOINTMENT (OUTPATIENT)
Dept: CT IMAGING | Age: 86
End: 2021-03-02
Payer: MEDICARE

## 2021-03-02 ENCOUNTER — HOSPITAL ENCOUNTER (EMERGENCY)
Age: 86
Discharge: HOME | End: 2021-03-02
Payer: MEDICARE

## 2021-03-02 VITALS
SYSTOLIC BLOOD PRESSURE: 156 MMHG | TEMPERATURE: 98.4 F | HEART RATE: 57 BPM | DIASTOLIC BLOOD PRESSURE: 67 MMHG | WEIGHT: 134.26 LBS | RESPIRATION RATE: 13 BRPM | BODY MASS INDEX: 27.07 KG/M2 | OXYGEN SATURATION: 97 % | HEIGHT: 59 IN

## 2021-03-02 DIAGNOSIS — R10.9 RIGHT FLANK PAIN: Primary | ICD-10-CM

## 2021-03-02 LAB
A/G RATIO: 1.2 (ref 1.1–2.2)
ALBUMIN SERPL-MCNC: 4 G/DL (ref 3.4–5)
ALP BLD-CCNC: 78 U/L (ref 40–129)
ALT SERPL-CCNC: 14 U/L (ref 10–40)
ANION GAP SERPL CALCULATED.3IONS-SCNC: 9 MMOL/L (ref 3–16)
AST SERPL-CCNC: 18 U/L (ref 15–37)
BASOPHILS ABSOLUTE: 0 K/UL (ref 0–0.2)
BASOPHILS RELATIVE PERCENT: 0.7 %
BILIRUB SERPL-MCNC: <0.2 MG/DL (ref 0–1)
BILIRUBIN URINE: NEGATIVE
BLOOD, URINE: NEGATIVE
BUN BLDV-MCNC: 14 MG/DL (ref 7–20)
CALCIUM SERPL-MCNC: 9.3 MG/DL (ref 8.3–10.6)
CHLORIDE BLD-SCNC: 105 MMOL/L (ref 99–110)
CLARITY: CLEAR
CO2: 26 MMOL/L (ref 21–32)
COLOR: YELLOW
CREAT SERPL-MCNC: 1 MG/DL (ref 0.6–1.2)
EOSINOPHILS ABSOLUTE: 0.4 K/UL (ref 0–0.6)
EOSINOPHILS RELATIVE PERCENT: 5.3 %
GFR AFRICAN AMERICAN: >60
GFR NON-AFRICAN AMERICAN: 53
GLOBULIN: 3.3 G/DL
GLUCOSE BLD-MCNC: 100 MG/DL (ref 70–99)
GLUCOSE URINE: NEGATIVE MG/DL
HCT VFR BLD CALC: 36.4 % (ref 36–48)
HEMOGLOBIN: 12.4 G/DL (ref 12–16)
KETONES, URINE: NEGATIVE MG/DL
LEUKOCYTE ESTERASE, URINE: NEGATIVE
LIPASE: 24 U/L (ref 13–60)
LYMPHOCYTES ABSOLUTE: 2 K/UL (ref 1–5.1)
LYMPHOCYTES RELATIVE PERCENT: 29.8 %
MCH RBC QN AUTO: 29.2 PG (ref 26–34)
MCHC RBC AUTO-ENTMCNC: 34 G/DL (ref 31–36)
MCV RBC AUTO: 85.8 FL (ref 80–100)
MICROSCOPIC EXAMINATION: NORMAL
MONOCYTES ABSOLUTE: 0.5 K/UL (ref 0–1.3)
MONOCYTES RELATIVE PERCENT: 7.9 %
NEUTROPHILS ABSOLUTE: 3.8 K/UL (ref 1.7–7.7)
NEUTROPHILS RELATIVE PERCENT: 56.3 %
NITRITE, URINE: NEGATIVE
PDW BLD-RTO: 13.7 % (ref 12.4–15.4)
PH UA: 6.5 (ref 5–8)
PLATELET # BLD: 192 K/UL (ref 135–450)
PMV BLD AUTO: 8.6 FL (ref 5–10.5)
POTASSIUM REFLEX MAGNESIUM: 4 MMOL/L (ref 3.5–5.1)
PRO-BNP: 74 PG/ML (ref 0–449)
PROTEIN UA: NEGATIVE MG/DL
RBC # BLD: 4.24 M/UL (ref 4–5.2)
SODIUM BLD-SCNC: 140 MMOL/L (ref 136–145)
SPECIFIC GRAVITY UA: 1.01 (ref 1–1.03)
TOTAL PROTEIN: 7.3 G/DL (ref 6.4–8.2)
TROPONIN: <0.01 NG/ML
URINE REFLEX TO CULTURE: NORMAL
URINE TYPE: NORMAL
UROBILINOGEN, URINE: 0.2 E.U./DL
WBC # BLD: 6.7 K/UL (ref 4–11)

## 2021-03-02 PROCEDURE — 83690 ASSAY OF LIPASE: CPT

## 2021-03-02 PROCEDURE — 83880 ASSAY OF NATRIURETIC PEPTIDE: CPT

## 2021-03-02 PROCEDURE — 85025 COMPLETE CBC W/AUTO DIFF WBC: CPT

## 2021-03-02 PROCEDURE — 99283 EMERGENCY DEPT VISIT LOW MDM: CPT

## 2021-03-02 PROCEDURE — 74177 CT ABD & PELVIS W/CONTRAST: CPT

## 2021-03-02 PROCEDURE — 6360000004 HC RX CONTRAST MEDICATION: Performed by: PHYSICIAN ASSISTANT

## 2021-03-02 PROCEDURE — 81003 URINALYSIS AUTO W/O SCOPE: CPT

## 2021-03-02 PROCEDURE — 84484 ASSAY OF TROPONIN QUANT: CPT

## 2021-03-02 PROCEDURE — 71260 CT THORAX DX C+: CPT

## 2021-03-02 PROCEDURE — 80053 COMPREHEN METABOLIC PANEL: CPT

## 2021-03-02 PROCEDURE — 93005 ELECTROCARDIOGRAM TRACING: CPT | Performed by: PHYSICIAN ASSISTANT

## 2021-03-02 RX ORDER — ACETAMINOPHEN 500 MG
1000 TABLET ORAL EVERY 6 HOURS PRN
Qty: 40 TABLET | Refills: 0 | Status: ON HOLD | OUTPATIENT
Start: 2021-03-02 | End: 2021-12-10

## 2021-03-02 RX ORDER — LIDOCAINE 50 MG/G
1 PATCH TOPICAL DAILY
Qty: 10 PATCH | Refills: 0 | Status: SHIPPED | OUTPATIENT
Start: 2021-03-02 | End: 2021-03-12

## 2021-03-02 RX ADMIN — IOPAMIDOL 75 ML: 755 INJECTION, SOLUTION INTRAVENOUS at 14:31

## 2021-03-02 ASSESSMENT — PAIN SCALES - GENERAL: PAINLEVEL_OUTOF10: 9

## 2021-03-02 ASSESSMENT — ENCOUNTER SYMPTOMS
BACK PAIN: 1
ABDOMINAL PAIN: 1
SORE THROAT: 0
VOMITING: 0
NAUSEA: 0
SHORTNESS OF BREATH: 0

## 2021-03-02 ASSESSMENT — PAIN DESCRIPTION - PAIN TYPE: TYPE: ACUTE PAIN

## 2021-03-02 ASSESSMENT — PAIN DESCRIPTION - LOCATION: LOCATION: FLANK

## 2021-03-02 NOTE — ED NOTES
Bed: B-09  Expected date:   Expected time:   Means of arrival:   Comments:  Mami 83 yo flank      Nahid Guerrero RN  03/02/21 3836

## 2021-03-02 NOTE — ED NOTES
Pt resting in bed at this time. Call light remains in reach instructed pt how to use, and encouraged pt to call if needed assistance, no distress noted. RR even and unlabored, skin warm and dry. No needs at this time. Will continue to monitor closely.          Vinayak Mccord RN  03/02/21 7847

## 2021-03-02 NOTE — ED NOTES
Pt arrived to the ED via private car, pt states that she has right side flank pain, pt is ambulatory and has a steady gait, pt is alert and orient vss afebrile, pt states pain is a 9/10 on pain scale      Nati Valerio, RN  03/02/21 1387

## 2021-03-02 NOTE — ED PROVIDER NOTES
[facility]      Pt Name: Dean Thompson  MRN: [id number]  [place] 1935  Date of evaluation: 3/2/2021  Provider: Vicky Brooks PA-C    This patient was not seen and evaluated by the attending physician No att. providers found. CHIEF COMPLAINT       Chief Complaint   Patient presents with    Flank Pain     R side, started in her back about a week ago, not has moved to her flank         HISTORYOF PRESENT ILLNESS  (Location/Symptom, Timing/Onset, Context/Setting, Quality, Duration, Modifying Factors, Severity.)   Dean Thompson is a 82 y.o. female who presents to the emergency department with right flank pain. The patient states she first developed pain in her low back about a week ago and it has now moved into her right flank region. It is pleuritic in nature and also worsens with movement. She has been using Excedrin with some relief. She rates it as 9 out of 10. She describes as sharp. She has felt somewhat short of breath because of the pain. She denies known injury or trauma but does state that she lifted something heavy recently. Denies any chest pain, cough, fever or urinary complaints. Nursing Notes were reviewedand I agree. REVIEW OF SYSTEMS    (2-9 systems for level 4, 10 or more forlevel 5)     Review of Systems   Constitutional: Negative for chills and fever. HENT: Negative for sore throat. Respiratory: Negative for shortness of breath. Cardiovascular: Negative for chest pain. Gastrointestinal: Positive for abdominal pain. Negative for nausea and vomiting. Genitourinary: Positive for flank pain. Negative for difficulty urinating and dysuria. Musculoskeletal: Positive for back pain. Skin: Negative for rash. Neurological: Negative for light-headedness and headaches. Psychiatric/Behavioral: The patient is not nervous/anxious. All other systems reviewed and are negative. Except as noted above the remainder ofthe review of systems was reviewed and negative. PAST MEDICALHISTORY         Diagnosis Date    Anesthesia complication     states under general anesthesia has trouble awaking    Arthritis     fibramyalgia, osteo, DJD    Cancer ([address]     skin cancer removed from left arm    GERD (gastroesophageal reflux disease)     Nausea & vomiting     Psoriasis     scalp, front of legs and back of arms    Unspecified cerebral artery occlusion with cerebral infarction 5 years ago    several TIA       SURGICAL HISTORY           Procedure Laterality Date    BUNIONECTOMY Right     EXAM UNDER GENERAL ANESTHESIA OF NASOPHARYNX      HAND SURGERY Right     related to arthritis    NOSE SURGERY  4 years ago    1) nasal fracture and 2nd) fungal infection removed    SKIN CANCER EXCISION Left 2014       CURRENT MEDICATIONS       Previous Medications    ALBUTEROL SULFATE  (90 BASE) MCG/ACT INHALER    Inhale 2 puffs into the lungs every 6 hours as needed for Shortness of Breath     ASPIRIN-ACETAMINOPHEN-CAFFEINE (EXCEDRIN EXTRA STRENGTH) 250-250-65 MG PER TABLET    Take 1 tablet by mouth 2 times daily     ATORVASTATIN (LIPITOR) 40 MG TABLET    Take 1 tablet by mouth nightly    DOCUSATE SODIUM (COLACE) 100 MG CAPSULE    Take 1 capsule by mouth 2 times daily    LISINOPRIL (PRINIVIL;ZESTRIL) 5 MG TABLET    Take 1 tablet by mouth daily    MULTIPLE VITAMINS-MINERALS (THERAPEUTIC MULTIVITAMIN-MINERALS) TABLET    Take 1 tablet by mouth daily.     OMEPRAZOLE (PRILOSEC) 40 MG DELAYED RELEASE CAPSULE    take one capsule by mouth daily    TERCONAZOLE (TERAZOL 7) 0.4 % VAGINAL CREAM    Place 1 applicator vaginally as needed (irritation)     VITAMIN D (ERGOCALCIFEROL) 26627 UNITS CAPS CAPSULE    Take 1 capsule by mouth once a week Friday       ALLERGIES     Sulfa antibiotics and Morphine    FAMILY HISTORY           Problem Relation Age of Onset    Asthma Mother     Heart Disease Mother  Cancer Mother     Heart Disease Father     Early Death Brother     Cancer Brother         esophogeal cancer    Asthma Brother     Heart Disease Brother      Family Status   Relation Name Status    Mother      Father      Brother      Brother          SOCIAL HISTORY    reports that she has quit smoking. She has never used smokeless tobacco. She reports that she does not drink alcohol or use drugs. PHYSICAL EXAM    (up to 7 for level 4, 8 or more for level 5)     ED Triage Vitals [21 1235]   BP Temp Temp Source Pulse Resp SpO2 Height Weight   (!) 174/77 97.5 °F (36.4 °C) Temporal 72 18 96 % 4' 11\" (1.499 m) 134 lb 4.2 oz (60.9 kg)       Physical Exam  Vitals signs and nursing note reviewed. Constitutional:       General: She is not in acute distress. Appearance: She is well-developed. HENT:      Head: Normocephalic and atraumatic. Neck:      Musculoskeletal: Neck supple. Cardiovascular:      Rate and Rhythm: Normal rate and regular rhythm. Heart sounds: Normal heart sounds. Pulmonary:      Effort: Pulmonary effort is normal. No respiratory distress. Breath sounds: Normal breath sounds. Chest:      Chest wall: Tenderness ( Right rib and flank region without skin changes, crepitance, ecchymosis, erythema or rash) present. Abdominal:      Palpations: Abdomen is soft. Tenderness: There is no abdominal tenderness. Musculoskeletal: Normal range of motion. General: Tenderness ( Right flank region without skin changes or rash) present. Skin:     General: Skin is warm and dry. Neurological:      Mental Status: She is alert and oriented to person, place, and time. Psychiatric:         Behavior: Behavior normal.            DIAGNOSTIC RESULTS     EK-lead EKG interpreted as NSR without acute ischemic change.     RADIOLOGY: Non-plain film images such as CT, Ultrasound and MRI are read by the radiologist.Plain radiographic images are visualized and preliminarily interpreted by Diana Zavala PA-C with the below findings:        Interpretation per the Radiologist below, if available at the time of this note:    CT ABDOMEN PELVIS W IV CONTRAST Additional Contrast? None   Final Result   No acute intra-abdominal abnormality         CT CHEST PULMONARY EMBOLISM W CONTRAST   Final Result   1. No evidence of pulmonary embolism or acute pulmonary abnormality. 2. 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 3. Cardiomegaly. LABS:  Labs Reviewed   COMPREHENSIVE METABOLIC PANEL W/ REFLEX TO MG FOR LOW K - Abnormal; Notable for the following components:       Result Value    Glucose 100 (*)     GFR Non- 53 (*)     All other components within normal limits    Narrative:     Performed at:  [address] [address]   Phone [phone]   CBC WITH AUTO DIFFERENTIAL    Narrative:     Performed at:  [address] [address]   Phone [phone]   LIPASE    Narrative:     Performed at:  [facility]  [address] [place] [address]   Phone [phone]   URINE RT REFLEX TO CULTURE    Narrative:     Performed at:  [address] [address]   Phone [phone]   TROPONIN    Narrative:     Performed at:  [address] [address]   Phone [phone]   BRAIN NATRIURETIC PEPTIDE    Narrative:     Performed at:  [facility]  [address] [place] [address]   Phone [phone]       All other labs were within normal range or not returned as of this dictation. EMERGENCY DEPARTMENT COURSE and DIFFERENTIAL DIAGNOSIS/MDM:   Vitals:    Vitals:    03/02/21 1445 03/02/21 1500 03/02/21 1515 03/02/21 1530   BP: (!) 146/72 118/66 125/65 133/74   Pulse: 66 72 61 63   Resp: 10 16 12 15   Temp:       TempSrc:       SpO2: 95% 95% 95% 100%   Weight:       Height:            I have evaluated this patient. My supervising physician was available for consultation. The patient's vitals are stable. She is not hypoxic. The pain is pleuritic in nature but also worse with movement and is reproducible on exam.  I suspect musculoskeletal etiology however a work-up was obtained secondary to patient's age. EKG showed no evidence of acute ischemic change. Troponin was less than 0.01. BNP not significantly elevated. Electrolytes were normal, renal function normal, normal white blood cell count and stable H&H. Urinalysis was clear without evidence of infection. CT abdomen pelvis with IV contrast showed no acute abnormality and given the pleuritic nature of pain and the shortness of breath she was also sent for chest CT and this was negative for pulmonary embolism. It did show thoracic aorta ectasia and cardiomegaly but no acute etiology for her discomfort. I do suspect musculoskeletal etiology. She does not tolerate pain medication or muscle relaxers well. I have recommended lidocaine patch. She was also prescribed extra Tylenol. She will use this at night rather than the Excedrin. I have recommended close follow-up with PCP. Discussed results, diagnosis and plan with patient and/or family. Questions addressed. Dispositionand follow-up agreed upon. Specific discharge instructions explained. The patient and/or family and I have discussed the diagnosis and risks, and we agree with discharging home to follow-up with their primary care,specialist or referral doctor. We also discussed returning to the Emergency Department immediately if new or worsening symptoms occur. We have discussed the symptoms which are most concerning that necessitate immediatereturn. PROCEDURES:  None    FINAL IMPRESSION      1. Right flank pain          DISPOSITION/PLAN   DISPOSITION Decision To Discharge 03/02/2021 03:58:13 PM      PATIENT REFERRED TO:  Lauro Vega. Chavez Delbert, [address]  [address] [address] [phone]    Schedule an appointment as soon as possible for a visit       [facility] Emergency Department  [address] [place] [address]  [phone]    If symptoms worsen      MEDICATIONS:  New Prescriptions    ACETAMINOPHEN (APAP EXTRA STRENGTH) 500 MG TABLET    Take 2 tablets by mouth every 6 hours as needed for Pain    LIDOCAINE (LIDODERM) 5 %    Place 1 patch onto the skin daily for 10 days 12 hours on, 12 hours off.        (Please note that portions of this note were completed with a voice recognition program.  Efforts were made toedit the dictations but occasionally words are mis-transcribed.)    ELVIA Lopez PA-C  03/02/21 2928

## 2021-03-03 LAB
EKG ATRIAL RATE: 59 BPM
EKG DIAGNOSIS: NORMAL
EKG P AXIS: 40 DEGREES
EKG P-R INTERVAL: 154 MS
EKG Q-T INTERVAL: 414 MS
EKG QRS DURATION: 78 MS
EKG QTC CALCULATION (BAZETT): 409 MS
EKG R AXIS: 18 DEGREES
EKG T AXIS: 31 DEGREES
EKG VENTRICULAR RATE: 59 BPM

## 2021-03-03 PROCEDURE — 93010 ELECTROCARDIOGRAM REPORT: CPT | Performed by: INTERNAL MEDICINE

## 2021-06-04 ENCOUNTER — APPOINTMENT (OUTPATIENT)
Dept: CT IMAGING | Age: 86
End: 2021-06-04
Payer: MEDICARE

## 2021-06-04 ENCOUNTER — HOSPITAL ENCOUNTER (EMERGENCY)
Age: 86
Discharge: HOME | End: 2021-06-04
Attending: EMERGENCY MEDICINE
Payer: MEDICARE

## 2021-06-04 VITALS
TEMPERATURE: 98.1 F | OXYGEN SATURATION: 98 % | SYSTOLIC BLOOD PRESSURE: 168 MMHG | WEIGHT: 133.82 LBS | BODY MASS INDEX: 26.98 KG/M2 | RESPIRATION RATE: 11 BRPM | DIASTOLIC BLOOD PRESSURE: 71 MMHG | HEIGHT: 59 IN | HEART RATE: 60 BPM

## 2021-06-04 DIAGNOSIS — R10.9 ABDOMINAL CRAMPS: Primary | ICD-10-CM

## 2021-06-04 LAB
A/G RATIO: 1.4 (ref 1.1–2.2)
ALBUMIN SERPL-MCNC: 4.1 G/DL (ref 3.4–5)
ALP BLD-CCNC: 90 U/L (ref 40–129)
ALT SERPL-CCNC: 13 U/L (ref 10–40)
ANION GAP SERPL CALCULATED.3IONS-SCNC: 12 MMOL/L (ref 3–16)
AST SERPL-CCNC: 20 U/L (ref 15–37)
BASOPHILS ABSOLUTE: 0.1 K/UL (ref 0–0.2)
BASOPHILS RELATIVE PERCENT: 0.8 %
BILIRUB SERPL-MCNC: <0.2 MG/DL (ref 0–1)
BILIRUBIN URINE: ABNORMAL
BLOOD, URINE: NEGATIVE
BUN BLDV-MCNC: 24 MG/DL (ref 7–20)
C DIFF TOXIN/ANTIGEN: NORMAL
CALCIUM SERPL-MCNC: 9.4 MG/DL (ref 8.3–10.6)
CHLORIDE BLD-SCNC: 99 MMOL/L (ref 99–110)
CLARITY: CLEAR
CO2: 21 MMOL/L (ref 21–32)
COLOR: ABNORMAL
CREAT SERPL-MCNC: 0.9 MG/DL (ref 0.6–1.2)
EOSINOPHILS ABSOLUTE: 0.4 K/UL (ref 0–0.6)
EOSINOPHILS RELATIVE PERCENT: 5 %
EPITHELIAL CELLS, UA: 1 /HPF (ref 0–5)
GFR AFRICAN AMERICAN: >60
GFR NON-AFRICAN AMERICAN: 60
GLOBULIN: 2.9 G/DL
GLUCOSE BLD-MCNC: 116 MG/DL (ref 70–99)
GLUCOSE URINE: NEGATIVE MG/DL
HCT VFR BLD CALC: 39.3 % (ref 36–48)
HEMOGLOBIN: 13.4 G/DL (ref 12–16)
HYALINE CASTS: 3 /LPF (ref 0–8)
KETONES, URINE: ABNORMAL MG/DL
LEUKOCYTE ESTERASE, URINE: ABNORMAL
LIPASE: 28 U/L (ref 13–60)
LYMPHOCYTES ABSOLUTE: 2.2 K/UL (ref 1–5.1)
LYMPHOCYTES RELATIVE PERCENT: 27 %
MCH RBC QN AUTO: 29.6 PG (ref 26–34)
MCHC RBC AUTO-ENTMCNC: 34 G/DL (ref 31–36)
MCV RBC AUTO: 86.9 FL (ref 80–100)
MICROSCOPIC EXAMINATION: YES
MONOCYTES ABSOLUTE: 0.6 K/UL (ref 0–1.3)
MONOCYTES RELATIVE PERCENT: 7 %
NEUTROPHILS ABSOLUTE: 4.9 K/UL (ref 1.7–7.7)
NEUTROPHILS RELATIVE PERCENT: 60.2 %
NITRITE, URINE: NEGATIVE
PDW BLD-RTO: 13.3 % (ref 12.4–15.4)
PH UA: 5 (ref 5–8)
PLATELET # BLD: 184 K/UL (ref 135–450)
PMV BLD AUTO: 8.9 FL (ref 5–10.5)
POTASSIUM REFLEX MAGNESIUM: 3.7 MMOL/L (ref 3.5–5.1)
PROTEIN UA: NEGATIVE MG/DL
RBC # BLD: 4.52 M/UL (ref 4–5.2)
RBC UA: 2 /HPF (ref 0–4)
SODIUM BLD-SCNC: 132 MMOL/L (ref 136–145)
SPECIFIC GRAVITY UA: 1.02 (ref 1–1.03)
TOTAL PROTEIN: 7 G/DL (ref 6.4–8.2)
TROPONIN: <0.01 NG/ML
URINE REFLEX TO CULTURE: ABNORMAL
URINE TYPE: ABNORMAL
UROBILINOGEN, URINE: 1 E.U./DL
WBC # BLD: 8.1 K/UL (ref 4–11)
WBC UA: 4 /HPF (ref 0–5)

## 2021-06-04 PROCEDURE — 80053 COMPREHEN METABOLIC PANEL: CPT

## 2021-06-04 PROCEDURE — 87324 CLOSTRIDIUM AG IA: CPT

## 2021-06-04 PROCEDURE — 6370000000 HC RX 637 (ALT 250 FOR IP): Performed by: EMERGENCY MEDICINE

## 2021-06-04 PROCEDURE — 6360000002 HC RX W HCPCS: Performed by: EMERGENCY MEDICINE

## 2021-06-04 PROCEDURE — 83690 ASSAY OF LIPASE: CPT

## 2021-06-04 PROCEDURE — 99284 EMERGENCY DEPT VISIT MOD MDM: CPT

## 2021-06-04 PROCEDURE — 6360000004 HC RX CONTRAST MEDICATION: Performed by: EMERGENCY MEDICINE

## 2021-06-04 PROCEDURE — 85025 COMPLETE CBC W/AUTO DIFF WBC: CPT

## 2021-06-04 PROCEDURE — 74177 CT ABD & PELVIS W/CONTRAST: CPT

## 2021-06-04 PROCEDURE — 84484 ASSAY OF TROPONIN QUANT: CPT

## 2021-06-04 PROCEDURE — 81001 URINALYSIS AUTO W/SCOPE: CPT

## 2021-06-04 PROCEDURE — 96361 HYDRATE IV INFUSION ADD-ON: CPT

## 2021-06-04 PROCEDURE — 2580000003 HC RX 258: Performed by: EMERGENCY MEDICINE

## 2021-06-04 PROCEDURE — 93005 ELECTROCARDIOGRAM TRACING: CPT | Performed by: EMERGENCY MEDICINE

## 2021-06-04 PROCEDURE — 96374 THER/PROPH/DIAG INJ IV PUSH: CPT

## 2021-06-04 PROCEDURE — 87449 NOS EACH ORGANISM AG IA: CPT

## 2021-06-04 RX ORDER — DICYCLOMINE HYDROCHLORIDE 10 MG/1
10 CAPSULE ORAL ONCE
Status: COMPLETED | OUTPATIENT
Start: 2021-06-04 | End: 2021-06-04

## 2021-06-04 RX ORDER — FENTANYL CITRATE 50 UG/ML
25 INJECTION, SOLUTION INTRAMUSCULAR; INTRAVENOUS ONCE
Status: DISCONTINUED | OUTPATIENT
Start: 2021-06-04 | End: 2021-06-05 | Stop reason: HOSPADM

## 2021-06-04 RX ORDER — ONDANSETRON 2 MG/ML
4 INJECTION INTRAMUSCULAR; INTRAVENOUS ONCE
Status: COMPLETED | OUTPATIENT
Start: 2021-06-04 | End: 2021-06-04

## 2021-06-04 RX ORDER — 0.9 % SODIUM CHLORIDE 0.9 %
500 INTRAVENOUS SOLUTION INTRAVENOUS ONCE
Status: COMPLETED | OUTPATIENT
Start: 2021-06-04 | End: 2021-06-04

## 2021-06-04 RX ORDER — DICYCLOMINE HYDROCHLORIDE 10 MG/1
10 CAPSULE ORAL 4 TIMES DAILY PRN
Qty: 20 CAPSULE | Refills: 0 | Status: ON HOLD | OUTPATIENT
Start: 2021-06-04 | End: 2021-12-10

## 2021-06-04 RX ADMIN — IOPAMIDOL 75 ML: 755 INJECTION, SOLUTION INTRAVENOUS at 19:56

## 2021-06-04 RX ADMIN — ONDANSETRON 4 MG: 2 INJECTION INTRAMUSCULAR; INTRAVENOUS at 19:12

## 2021-06-04 RX ADMIN — SODIUM CHLORIDE 500 ML: 9 INJECTION, SOLUTION INTRAVENOUS at 19:09

## 2021-06-04 RX ADMIN — DICYCLOMINE HYDROCHLORIDE 10 MG: 10 CAPSULE ORAL at 22:50

## 2021-06-04 ASSESSMENT — PAIN DESCRIPTION - LOCATION: LOCATION: ABDOMEN

## 2021-06-04 ASSESSMENT — PAIN DESCRIPTION - DESCRIPTORS: DESCRIPTORS: CRAMPING

## 2021-06-04 ASSESSMENT — PAIN SCALES - GENERAL: PAINLEVEL_OUTOF10: 5

## 2021-06-04 ASSESSMENT — ENCOUNTER SYMPTOMS
VOMITING: 1
DIARRHEA: 1
ABDOMINAL PAIN: 1
RHINORRHEA: 0
SORE THROAT: 0
NAUSEA: 1
SHORTNESS OF BREATH: 0
EYE REDNESS: 0

## 2021-06-04 NOTE — ED NOTES
Patient states she has to have a bowel movement and walked to the restroom.       [facility]  06/04/21 5005

## 2021-06-04 NOTE — ED PROVIDER NOTES
MG per tablet Take 1 tablet by mouth 2 times daily Historical Med      Multiple Vitamins-Minerals (THERAPEUTIC MULTIVITAMIN-MINERALS) tablet Take 1 tablet by mouth daily. ALLERGIES     Sulfa antibiotics and Morphine    FAMILY HISTORY       Family History   Problem Relation Age of Onset    Asthma Mother     Heart Disease Mother     Cancer Mother     Heart Disease Father     Early Death Brother     Cancer Brother         esophogeal cancer    Asthma Brother     Heart Disease Brother           SOCIAL HISTORY       Social History     Socioeconomic History    Marital status:      Spouse name: None    Number of children: None    Years of education: None    Highest education level: None   Occupational History    None   Tobacco Use    Smoking status: Former Smoker    Smokeless tobacco: Never Used    Tobacco comment: smokes as a teenager quit when she was 23years old   Vaping Use    Vaping Use: Never used   Substance and Sexual Activity    Alcohol use: No    Drug use: No    Sexual activity: Not Currently     Partners: Male   Other Topics Concern    None   Social History Narrative    None     Social Determinants of Health     Financial Resource Strain:     Difficulty of Paying Living Expenses:    Food Insecurity:     Worried About Running Out of Food in the Last Year:     Ran Out of Food in the Last Year:    Transportation Needs:     Lack of Transportation (Medical):      Lack of Transportation (Non-Medical):    Physical Activity:     Days of Exercise per Week:     Minutes of Exercise per Session:    Stress:     Feeling of Stress :    Social Connections:     Frequency of Communication with Friends and Family:     Frequency of Social Gatherings with Friends and Family:     Attends Anabaptist Services:     Active Member of Clubs or Organizations:     Attends Club or Organization Meetings:     Marital Status:    Intimate Partner Violence:     Fear of Current or Ex-Partner: ms.          RADIOLOGY:   Non-plain filmimages such as CT, Ultrasound and MRI are read by the radiologist. Plain radiographic images are visualized and preliminarily interpreted by the emergency physician with the below findings:        Interpretation per the Radiologist below, if available at the time ofthis note:    CT ABDOMEN PELVIS W IV CONTRAST Additional Contrast? None   Final Result   Mild mural thickening of the colon, perhaps colitis. No obstruction or acute inflammatory abnormality is otherwise identified.                ED BEDSIDE ULTRASOUND:   Performed by ED Physician - none    LABS:  Labs Reviewed   COMPREHENSIVE METABOLIC PANEL W/ REFLEX TO MG FOR LOW K - Abnormal; Notable for the following components:       Result Value    Sodium 132 (*)     Glucose 116 (*)     BUN 24 (*)     GFR Non- 60 (*)     All other components within normal limits    Narrative:     Performed at:  [address] [address]   Phone [phone]   URINE RT REFLEX TO CULTURE - Abnormal; Notable for the following components:    Bilirubin Urine SMALL (*)     Ketones, Urine TRACE (*)     Leukocyte Esterase, Urine SMALL (*)     All other components within normal limits    Narrative:     Performed at:  [address] [address]   Phone [phone]   C DIFF TOXIN/ANTIGEN    Narrative:     ORDER#: [id number]                          ORDERED BY: Alexandr Cabrera  SOURCE: Stool                              COLLECTED:  06/04/21 19:27  ANTIBIOTICS AT NARA.:                      RECEIVED :  06/04/21 19:27  Collect White vial (sterile container)  Performed at:  [address] [address]   Phone [phone]   CBC WITH AUTO DIFFERENTIAL    Narrative:     Performed at:  [address] [place] [address] Laboratory  [address] [facility] [address] [place] [address]   Phone [phone]   LIPASE    Narrative:     Performed at:  [facility]  [address] [facility] [place] [address]   Phone [phone]   TROPONIN    Narrative:     Performed at:  [facility]  [address] [facility] [place] [address]   Phone [phone]   MICROSCOPIC URINALYSIS    Narrative:     Performed at:  [facility]  [address] [facility] [address]   Phone [phone]       All other labs were within normal range or not returned as of this dictation. EMERGENCY DEPARTMENT COURSE and DIFFERENTIAL DIAGNOSIS/MDM:   Vitals:    Vitals:    06/04/21 1842 06/04/21 2206 06/04/21 2321   BP: (!) 134/95 (!) 168/71 (!) 168/71   Pulse: 99 61 60   Resp: 14  11   Temp: 98.1 °F (36.7 °C)     TempSrc: Oral     SpO2: 95% 97% 98%   Weight: 133 lb 13.1 oz (60.7 kg)     Height: 4' 11\" (1.499 m)             MDM  Number of Diagnoses or Management Options  Diagnosis management comments: DDX: Ileus, bowel obstruction, colitis, infectious colitis, mesenteric ischemia, other    2010 -as of this time the patient seemed to have is negative for C. difficile. Her urinalysis showed no obvious infection. Her BUN is mildly elevated at 24, creatinine 0.9, GFR 60. Lipase normal.  Troponin normal.  White count normal.  No anemia. The patient CT scan has not yet returned. I have turned this patient over to Dr. Justina Avalos to await the results of the scan and reevaluate the patient. CRITICAL CARE TIME   Total Critical Care time was 0 minutes, excluding separately reportable procedures. There was a high probability of clinically significant/life threatening deterioration in the patient's condition which required my urgent intervention. CONSULTS:  None    PROCEDURES:  Unless otherwise noted below, none     Procedures    FINAL IMPRESSION      1.  Abdominal cramps          DISPOSITION/PLAN

## 2021-06-04 NOTE — ED NOTES
Bed: [facility]  Expected date: 6/4/21  Expected time: 6:07 PM  Means of arrival: [facility]  Comments:  AMY Jackson RN  06/04/21 1976

## 2021-06-05 LAB
EKG ATRIAL RATE: 94 BPM
EKG DIAGNOSIS: NORMAL
EKG P AXIS: 47 DEGREES
EKG P-R INTERVAL: 130 MS
EKG Q-T INTERVAL: 360 MS
EKG QRS DURATION: 86 MS
EKG QTC CALCULATION (BAZETT): 450 MS
EKG R AXIS: 0 DEGREES
EKG T AXIS: 30 DEGREES
EKG VENTRICULAR RATE: 94 BPM

## 2021-06-05 PROCEDURE — 93010 ELECTROCARDIOGRAM REPORT: CPT | Performed by: INTERNAL MEDICINE

## 2021-06-05 NOTE — ED PROVIDER NOTES
if any problems. Patient was feeling much better at discharge.     Vitals:    06/04/21 2321   BP: (!) 168/71   Pulse: 60   Resp: 11   Temp:    SpO2: 98%       Labs Reviewed   COMPREHENSIVE METABOLIC PANEL W/ REFLEX TO MG FOR LOW K - Abnormal; Notable for the following components:       Result Value    Sodium 132 (*)     Glucose 116 (*)     BUN 24 (*)     GFR Non- 60 (*)     All other components within normal limits    Narrative:     Performed at:  [facility]  [address] [facility] [employer] [address]   Phone [phone]   URINE RT REFLEX TO CULTURE - Abnormal; Notable for the following components:    Bilirubin Urine SMALL (*)     Ketones, Urine TRACE (*)     Leukocyte Esterase, Urine SMALL (*)     All other components within normal limits    Narrative:     Performed at:  [facility]  [address] [facility] [employer] [address]   Phone [phone]   C DIFF TOXIN/ANTIGEN    Narrative:     ORDER#: [id number]                          ORDERED BY: Rosalie Ochoa  SOURCE: Stool                              COLLECTED:  06/04/21 19:27  ANTIBIOTICS AT NARA.:                      RECEIVED :  06/04/21 19:27  Collect White vial (sterile container)  Performed at:  [facility]  [address] [email or web address] [address]   Phone [phone]   CBC WITH AUTO DIFFERENTIAL    Narrative:     Performed at:  [facility]  [address] [email or web address] [address]   Phone [phone]   LIPASE    Narrative:     Performed at:  [facility]  [address] [email or web address] [address]   Phone [phone]   TROPONIN    Narrative:     Performed at:  [facility]  [address] [facility] [address]   Phone [phone]   MICROSCOPIC URINALYSIS    Narrative:     Performed at:  [facility]  [address] [facility] [address]   Phone [phone]       CT ABDOMEN PELVIS W IV CONTRAST Additional Contrast? None   Final Result   Mild mural thickening of the colon, perhaps colitis. No obstruction or acute inflammatory abnormality is otherwise identified. Pertinent Labs & Imaging studies reviewed. (See chart for details)    Patient remained stable in the ED. The patient's blood pressure was found to be elevated according to CMS/Medicare and the Affordable Care Act/ObamaCare criteria. Elevated blood pressure could occur because of pain or anxiety or other reasons and does not mean that they need to have their blood pressure treated or medications otherwise adjusted. However, this could also be a sign that they will need to have their blood pressure treated or medications changed. The patient was instructed to follow up closely with their personal physician to have their blood pressure rechecked. The patient was instructed to take a list of recent blood pressure readings to their next visit with their personal physician. See discharge instructions for specific medications, discharge information, and treatments. They were verbally instructed to return to emergency if any problems. Medications   0.9 % sodium chloride bolus (0 mLs Intravenous Stopped 6/4/21 2220)   ondansetron (ZOFRAN) injection 4 mg (4 mg Intravenous Given 6/4/21 1912)   iopamidol (ISOVUE-370) 76 % injection 75 mL (75 mLs Intravenous Given 6/4/21 1956)   dicyclomine (BENTYL) capsule 10 mg (10 mg Oral Given 6/4/21 2250)       Discharge Medication List as of 6/4/2021 10:20 PM      START taking these medications    Details   dicyclomine (BENTYL) 10 MG capsule Take 1 capsule by mouth 4 times daily as needed (Abdominal pain or cramping), Disp-20 capsule, R-0Print             (This chart has been completed using [address].  Although attempts have been made to ensure accuracy, words and/or phrases may not be transcribed as intended.)      IMPRESSION(S):  1.  Abdominal cramps               Franco Mckeon DO  06/05/21 4877

## 2021-06-05 NOTE — ED NOTES
.Pt discharged at this time. Discharge instructions and medications reviewed,  Questions were answered. PT verbalized understanding. Follow up appointments were discussed.          [facility]  06/04/21 7794

## 2021-06-05 NOTE — ED NOTES
Pt called out to go to the restroom. Pt was taken off the monitor and was able to walk to the restroom for a bowel movement and walk back without a problem.        Jeronimo Castro  06/04/21 1465

## 2021-12-09 ENCOUNTER — APPOINTMENT (OUTPATIENT)
Dept: CT IMAGING | Age: 86
End: 2021-12-09
Payer: MEDICARE

## 2021-12-09 ENCOUNTER — APPOINTMENT (OUTPATIENT)
Dept: GENERAL RADIOLOGY | Age: 86
End: 2021-12-09
Payer: MEDICARE

## 2021-12-09 ENCOUNTER — HOSPITAL ENCOUNTER (OUTPATIENT)
Age: 86
Setting detail: OBSERVATION
Discharge: HOME | End: 2021-12-11
Attending: EMERGENCY MEDICINE | Admitting: INTERNAL MEDICINE
Payer: MEDICARE

## 2021-12-09 DIAGNOSIS — R07.9 CHEST PAIN, UNSPECIFIED TYPE: Primary | ICD-10-CM

## 2021-12-09 DIAGNOSIS — I10 ESSENTIAL HYPERTENSION: ICD-10-CM

## 2021-12-09 LAB
A/G RATIO: 1.7 (ref 1.1–2.2)
ALBUMIN SERPL-MCNC: 4.2 G/DL (ref 3.4–5)
ALP BLD-CCNC: 65 U/L (ref 40–129)
ALT SERPL-CCNC: 14 U/L (ref 10–40)
ANION GAP SERPL CALCULATED.3IONS-SCNC: 10 MMOL/L (ref 3–16)
AST SERPL-CCNC: 18 U/L (ref 15–37)
BASOPHILS ABSOLUTE: 0 K/UL (ref 0–0.2)
BASOPHILS RELATIVE PERCENT: 0.8 %
BILIRUB SERPL-MCNC: <0.2 MG/DL (ref 0–1)
BUN BLDV-MCNC: 16 MG/DL (ref 7–20)
CALCIUM SERPL-MCNC: 9.2 MG/DL (ref 8.3–10.6)
CHLORIDE BLD-SCNC: 106 MMOL/L (ref 99–110)
CO2: 24 MMOL/L (ref 21–32)
CREAT SERPL-MCNC: 0.7 MG/DL (ref 0.6–1.2)
EOSINOPHILS ABSOLUTE: 0.3 K/UL (ref 0–0.6)
EOSINOPHILS RELATIVE PERCENT: 5.8 %
GFR AFRICAN AMERICAN: >60
GFR NON-AFRICAN AMERICAN: >60
GLUCOSE BLD-MCNC: 104 MG/DL (ref 70–99)
HCT VFR BLD CALC: 37.1 % (ref 36–48)
HEMOGLOBIN: 12.4 G/DL (ref 12–16)
LYMPHOCYTES ABSOLUTE: 2.1 K/UL (ref 1–5.1)
LYMPHOCYTES RELATIVE PERCENT: 41.6 %
MCH RBC QN AUTO: 29 PG (ref 26–34)
MCHC RBC AUTO-ENTMCNC: 33.4 G/DL (ref 31–36)
MCV RBC AUTO: 87 FL (ref 80–100)
MONOCYTES ABSOLUTE: 0.5 K/UL (ref 0–1.3)
MONOCYTES RELATIVE PERCENT: 9.7 %
NEUTROPHILS ABSOLUTE: 2.1 K/UL (ref 1.7–7.7)
NEUTROPHILS RELATIVE PERCENT: 42.1 %
PDW BLD-RTO: 13.4 % (ref 12.4–15.4)
PLATELET # BLD: 200 K/UL (ref 135–450)
PMV BLD AUTO: 8.2 FL (ref 5–10.5)
POTASSIUM REFLEX MAGNESIUM: 3.9 MMOL/L (ref 3.5–5.1)
RBC # BLD: 4.27 M/UL (ref 4–5.2)
SODIUM BLD-SCNC: 140 MMOL/L (ref 136–145)
TOTAL PROTEIN: 6.7 G/DL (ref 6.4–8.2)
TROPONIN: <0.01 NG/ML
WBC # BLD: 5 K/UL (ref 4–11)

## 2021-12-09 PROCEDURE — 2500000003 HC RX 250 WO HCPCS: Performed by: EMERGENCY MEDICINE

## 2021-12-09 PROCEDURE — 96374 THER/PROPH/DIAG INJ IV PUSH: CPT

## 2021-12-09 PROCEDURE — 6370000000 HC RX 637 (ALT 250 FOR IP): Performed by: EMERGENCY MEDICINE

## 2021-12-09 PROCEDURE — 80053 COMPREHEN METABOLIC PANEL: CPT

## 2021-12-09 PROCEDURE — 71045 X-RAY EXAM CHEST 1 VIEW: CPT

## 2021-12-09 PROCEDURE — 93005 ELECTROCARDIOGRAM TRACING: CPT | Performed by: EMERGENCY MEDICINE

## 2021-12-09 PROCEDURE — 84484 ASSAY OF TROPONIN QUANT: CPT

## 2021-12-09 PROCEDURE — 36415 COLL VENOUS BLD VENIPUNCTURE: CPT

## 2021-12-09 PROCEDURE — 70450 CT HEAD/BRAIN W/O DYE: CPT

## 2021-12-09 PROCEDURE — 99284 EMERGENCY DEPT VISIT MOD MDM: CPT

## 2021-12-09 PROCEDURE — G0378 HOSPITAL OBSERVATION PER HR: HCPCS

## 2021-12-09 PROCEDURE — 85025 COMPLETE CBC W/AUTO DIFF WBC: CPT

## 2021-12-09 RX ORDER — ONDANSETRON 4 MG/1
4 TABLET, ORALLY DISINTEGRATING ORAL EVERY 8 HOURS PRN
Status: DISCONTINUED | OUTPATIENT
Start: 2021-12-09 | End: 2021-12-11 | Stop reason: HOSPADM

## 2021-12-09 RX ORDER — NITROGLYCERIN 0.4 MG/1
0.4 TABLET SUBLINGUAL EVERY 5 MIN PRN
Status: DISCONTINUED | OUTPATIENT
Start: 2021-12-09 | End: 2021-12-11 | Stop reason: HOSPADM

## 2021-12-09 RX ORDER — ATORVASTATIN CALCIUM 40 MG/1
40 TABLET, FILM COATED ORAL NIGHTLY
Status: DISCONTINUED | OUTPATIENT
Start: 2021-12-09 | End: 2021-12-11

## 2021-12-09 RX ORDER — DOCUSATE SODIUM 100 MG/1
100 CAPSULE, LIQUID FILLED ORAL 2 TIMES DAILY
Status: DISCONTINUED | OUTPATIENT
Start: 2021-12-09 | End: 2021-12-11 | Stop reason: HOSPADM

## 2021-12-09 RX ORDER — ACETAMINOPHEN 325 MG/1
650 TABLET ORAL EVERY 6 HOURS PRN
Status: DISCONTINUED | OUTPATIENT
Start: 2021-12-09 | End: 2021-12-11 | Stop reason: HOSPADM

## 2021-12-09 RX ORDER — ONDANSETRON 2 MG/ML
4 INJECTION INTRAMUSCULAR; INTRAVENOUS EVERY 6 HOURS PRN
Status: DISCONTINUED | OUTPATIENT
Start: 2021-12-09 | End: 2021-12-11 | Stop reason: HOSPADM

## 2021-12-09 RX ORDER — ASPIRIN 81 MG/1
324 TABLET, CHEWABLE ORAL ONCE
Status: COMPLETED | OUTPATIENT
Start: 2021-12-09 | End: 2021-12-09

## 2021-12-09 RX ORDER — LABETALOL HYDROCHLORIDE 5 MG/ML
10 INJECTION, SOLUTION INTRAVENOUS ONCE
Status: COMPLETED | OUTPATIENT
Start: 2021-12-09 | End: 2021-12-09

## 2021-12-09 RX ORDER — LISINOPRIL 5 MG/1
5 TABLET ORAL DAILY
Status: DISCONTINUED | OUTPATIENT
Start: 2021-12-10 | End: 2021-12-10

## 2021-12-09 RX ORDER — NITROGLYCERIN 0.4 MG/1
0.4 TABLET SUBLINGUAL EVERY 5 MIN PRN
Status: DISCONTINUED | OUTPATIENT
Start: 2021-12-09 | End: 2021-12-09 | Stop reason: SDUPTHER

## 2021-12-09 RX ORDER — SODIUM CHLORIDE 0.9 % (FLUSH) 0.9 %
5-40 SYRINGE (ML) INJECTION PRN
Status: DISCONTINUED | OUTPATIENT
Start: 2021-12-09 | End: 2021-12-11 | Stop reason: HOSPADM

## 2021-12-09 RX ORDER — ACETAMINOPHEN 650 MG/1
650 SUPPOSITORY RECTAL EVERY 6 HOURS PRN
Status: DISCONTINUED | OUTPATIENT
Start: 2021-12-09 | End: 2021-12-11 | Stop reason: HOSPADM

## 2021-12-09 RX ORDER — ASPIRIN 81 MG/1
81 TABLET, CHEWABLE ORAL DAILY
Status: DISCONTINUED | OUTPATIENT
Start: 2021-12-10 | End: 2021-12-11 | Stop reason: HOSPADM

## 2021-12-09 RX ORDER — POLYETHYLENE GLYCOL 3350 17 G/17G
17 POWDER, FOR SOLUTION ORAL DAILY PRN
Status: DISCONTINUED | OUTPATIENT
Start: 2021-12-09 | End: 2021-12-11 | Stop reason: HOSPADM

## 2021-12-09 RX ORDER — SODIUM CHLORIDE 0.9 % (FLUSH) 0.9 %
5-40 SYRINGE (ML) INJECTION EVERY 12 HOURS SCHEDULED
Status: DISCONTINUED | OUTPATIENT
Start: 2021-12-09 | End: 2021-12-11 | Stop reason: HOSPADM

## 2021-12-09 RX ORDER — SODIUM CHLORIDE 9 MG/ML
25 INJECTION, SOLUTION INTRAVENOUS PRN
Status: DISCONTINUED | OUTPATIENT
Start: 2021-12-09 | End: 2021-12-11 | Stop reason: HOSPADM

## 2021-12-09 RX ORDER — ERGOCALCIFEROL 1.25 MG/1
50000 CAPSULE ORAL WEEKLY
Status: DISCONTINUED | OUTPATIENT
Start: 2021-12-09 | End: 2021-12-11 | Stop reason: HOSPADM

## 2021-12-09 RX ORDER — PANTOPRAZOLE SODIUM 40 MG/1
40 TABLET, DELAYED RELEASE ORAL
Status: DISCONTINUED | OUTPATIENT
Start: 2021-12-10 | End: 2021-12-11 | Stop reason: HOSPADM

## 2021-12-09 RX ADMIN — LABETALOL HYDROCHLORIDE 10 MG: 5 INJECTION INTRAVENOUS at 19:22

## 2021-12-09 RX ADMIN — ASPIRIN 324 MG: 81 TABLET, CHEWABLE ORAL at 19:21

## 2021-12-09 ASSESSMENT — PAIN SCALES - GENERAL
PAINLEVEL_OUTOF10: 3
PAINLEVEL_OUTOF10: 7
PAINLEVEL_OUTOF10: 0

## 2021-12-09 ASSESSMENT — PAIN DESCRIPTION - DESCRIPTORS: DESCRIPTORS: PRESSURE

## 2021-12-09 ASSESSMENT — PAIN DESCRIPTION - LOCATION: LOCATION: HEAD

## 2021-12-09 ASSESSMENT — PAIN DESCRIPTION - PAIN TYPE: TYPE: ACUTE PAIN

## 2021-12-09 NOTE — ED PROVIDER NOTES
[id number] [facility] PROVIDER NOTE    Patient Identification  Pt Name: Jose Ny  MRN: [id number]  Ramilagfmaria a 1935  Date of evaluation: 12/9/2021  Provider: Hawa Sosa MD  PCP: Kacy Colin MD    Chief Complaint  Hypertension    HPI  History provided by patient   This is a 82 y.o. female who presents to the ED for hypertension. Has been ongoing since last Thursday. States that this is been brought on due to new stress. She recently had to move on her own. She for the past few days has been experiencing left shoulder discomfort, left jaw discomfort, and left-sided chest discomfort described as an ache. The area is nontender. She is more short of breath than she used to be. No fevers or chills or cough. She also has been having lightheadedness/dizziness for 1 week. ROS  12 systems reviewed, pertinent positives/negatives per HPI otherwise noted to be negative.     I have reviewed the following nursing documentation:  Allergies: Sulfa antibiotics and Morphine    Past medical history:   Past Medical History:   Diagnosis Date    Anesthesia complication     states under general anesthesia has trouble awaking    Arthritis     fibramyalgia, osteo, DJD    Cancer ([facility] [address]     skin cancer removed from left arm    GERD (gastroesophageal reflux disease)     Nausea & vomiting     Psoriasis     scalp, front of legs and back of arms    Unspecified cerebral artery occlusion with cerebral infarction 5 years ago    several TIA     Past surgical history:   Past Surgical History:   Procedure Laterality Date    BUNIONECTOMY Right     EXAM UNDER GENERAL ANESTHESIA OF NASOPHARYNX      HAND SURGERY Right     related to arthritis    NOSE SURGERY  4 years ago    1) nasal fracture and 2nd) fungal infection removed    SKIN CANCER EXCISION Left 2014       Home medications:   Previous Medications    ACETAMINOPHEN (APAP EXTRA STRENGTH) 500 MG TABLET    Take 2 tablets by mouth every 6 hours as needed for Pain    ALBUTEROL SULFATE  (90 BASE) MCG/ACT INHALER    Inhale 2 puffs into the lungs every 6 hours as needed for Shortness of Breath     ASPIRIN-ACETAMINOPHEN-CAFFEINE (EXCEDRIN EXTRA STRENGTH) 250-250-65 MG PER TABLET    Take 1 tablet by mouth 2 times daily     ATORVASTATIN (LIPITOR) 40 MG TABLET    Take 1 tablet by mouth nightly    DICYCLOMINE (BENTYL) 10 MG CAPSULE    Take 1 capsule by mouth 4 times daily as needed (Abdominal pain or cramping)    DOCUSATE SODIUM (COLACE) 100 MG CAPSULE    Take 1 capsule by mouth 2 times daily    LISINOPRIL (PRINIVIL;ZESTRIL) 5 MG TABLET    Take 1 tablet by mouth daily    MULTIPLE VITAMINS-MINERALS (THERAPEUTIC MULTIVITAMIN-MINERALS) TABLET    Take 1 tablet by mouth daily. OMEPRAZOLE (PRILOSEC) 40 MG DELAYED RELEASE CAPSULE    take one capsule by mouth daily    TERCONAZOLE (TERAZOL 7) 0.4 % VAGINAL CREAM    Place 1 applicator vaginally as needed (irritation)     VITAMIN D (ERGOCALCIFEROL) 48098 UNITS CAPS CAPSULE    Take 1 capsule by mouth once a week Friday       Social history:  reports that she has quit smoking. She has never used smokeless tobacco. She reports that she does not drink alcohol and does not use drugs. Family history:    Family History   Problem Relation Age of Onset    Asthma Mother     Heart Disease Mother     Cancer Mother     Heart Disease Father     Early Death Brother     Cancer Brother         esophogeal cancer    Asthma Brother     Heart Disease Brother          Exam  ED Triage Vitals [12/09/21 1657]   BP Temp Temp Source Pulse Resp SpO2 Height Weight   (!) 190/94 98.8 °F (37.1 °C) Oral 75 16 96 % 4' 11\" (1.499 m) 135 lb 9.3 oz (61.5 kg)     Nursing note and vitals reviewed. Constitutional: In no acute distress  HENT:      Head: Normocephalic      Ears: External ears normal.      Nose: Nose normal.     Mouth: Membrane mucosa moist   Eyes: No discharge. Neck: Supple. Trachea midline. Cardiovascular: Regular rate. Warm extremities  Pulmonary/Chest: Effort normal. No respiratory distress. Abdominal: Soft. No distension. Nontender  : Deferred  Rectal: Deferred   Musculoskeletal: Moves all extremities. No gross deformity. Neurological: Alert and oriented. Face symmetric. Speech is clear. Cranial nerves II to XII intact. Pupils equal round reactive to light. NIH stroke scale zero. 5 out of 5 bilateral upper and lower extremity strength. Normal sensation light palpation bilateral lower extremities  Skin: Warm and dry. Psychiatric: Normal mood and affect. Behavior is normal.    Procedures      EKG  The Ekg interpreted by me in the absence of a cardiologist shows. Normal sinus rhythm. No specific ST changes appreciated. HR 63  No significant change from prior EKG dated 6/21        Radiology  XR CHEST PORTABLE   Preliminary Result   1. No acute cardiopulmonary disease. CT Head WO Contrast   Final Result      No acute findings in the head/brain.              Labs  Results for orders placed or performed during the hospital encounter of 12/09/21   CBC Auto Differential   Result Value Ref Range    WBC 5.0 4.0 - 11.0 K/uL    RBC 4.27 4.00 - 5.20 M/uL    Hemoglobin 12.4 12.0 - 16.0 g/dL    Hematocrit 37.1 36.0 - 48.0 %    MCV 87.0 80.0 - 100.0 fL    MCH 29.0 26.0 - 34.0 pg    MCHC 33.4 31.0 - 36.0 g/dL    RDW 13.4 12.4 - 15.4 %    Platelets 250 034 - 365 K/uL    MPV 8.2 5.0 - 10.5 fL    Neutrophils % 42.1 %    Lymphocytes % 41.6 %    Monocytes % 9.7 %    Eosinophils % 5.8 %    Basophils % 0.8 %    Neutrophils Absolute 2.1 1.7 - 7.7 K/uL    Lymphocytes Absolute 2.1 1.0 - 5.1 K/uL    Monocytes Absolute 0.5 0.0 - 1.3 K/uL    Eosinophils Absolute 0.3 0.0 - 0.6 K/uL    Basophils Absolute 0.0 0.0 - 0.2 K/uL   Comprehensive Metabolic Panel w/ Reflex to MG   Result Value Ref Range    Sodium 140 136 - 145 mmol/L    Potassium reflex Magnesium 3.9 3.5 - 5.1 mmol/L    Chloride 106 99 - 110 mmol/L    CO2 24 21 - 32 mmol/L Anion Gap 10 3 - 16    Glucose 104 (H) 70 - 99 mg/dL    BUN 16 7 - 20 mg/dL    CREATININE 0.7 0.6 - 1.2 mg/dL    GFR Non-African American >60 >60    GFR African American >60 >60    Calcium 9.2 8.3 - 10.6 mg/dL    Total Protein 6.7 6.4 - 8.2 g/dL    Albumin 4.2 3.4 - 5.0 g/dL    Albumin/Globulin Ratio 1.7 1.1 - 2.2    Total Bilirubin <0.2 0.0 - 1.0 mg/dL    Alkaline Phosphatase 65 40 - 129 U/L    ALT 14 10 - 40 U/L    AST 18 15 - 37 U/L   Troponin   Result Value Ref Range    Troponin <0.01 <0.01 ng/mL       Screenings  NIH Stroke Scale  Interval: Baseline  Level of Consciousness (1a. ): Alert  LOC Questions (1b. ): Answers both correctly  LOC Commands (1c. ): Performs both tasks correctly  Best Gaze (2. ): Normal  Visual (3. ): No visual loss  Facial Palsy (4. ): Normal symmetrical movement  Motor Arm, Left (5a. ): No drift  Motor Arm, Right (5b. ): No drift  Motor Leg, Left (6a. ): No drift (Some difficulty with this test bilaterally initially due to chronic cramps)  Motor Leg, Right (6b. ): No drift (Some difficulty with this test bilaterally initially due to chronic cramps.)  Limb Ataxia (7. ): Absent  Sensory (8. ): Normal  Best Language (9. ): No aphasia  Dysarthria (10. ): Normal  Extinction and Inattention (11): No abnormality  Total: 0        MDM and ED Course  This is a 82 y.o. female who presents to the ED for chief complaint of hypertension. After discussing with her, she is also been having chest discomfort with left jaw and left arm discomfort. May be ACS. Hypertensive urgency/emergency also in differnetial.  Has also been having lightheadedness for 1 week. No focal neuro deficits to indicate stroke. Will obtain CT head to evaluate for intracranial bleed although suspicion is low for this. Anticipate admission    -------    CT head shows no bleed. Negative troponin. EKG shows no acute ischemic changes.   Will admit patient to hospitalist service for chest pain work-up and blood pressure control

## 2021-12-10 LAB
ANION GAP SERPL CALCULATED.3IONS-SCNC: 11 MMOL/L (ref 3–16)
BUN BLDV-MCNC: 13 MG/DL (ref 7–20)
CALCIUM SERPL-MCNC: 8.9 MG/DL (ref 8.3–10.6)
CHLORIDE BLD-SCNC: 105 MMOL/L (ref 99–110)
CHOLESTEROL, TOTAL: 212 MG/DL (ref 0–199)
CO2: 24 MMOL/L (ref 21–32)
CREAT SERPL-MCNC: 0.7 MG/DL (ref 0.6–1.2)
EKG ATRIAL RATE: 63 BPM
EKG DIAGNOSIS: NORMAL
EKG P AXIS: 38 DEGREES
EKG P-R INTERVAL: 148 MS
EKG Q-T INTERVAL: 388 MS
EKG QRS DURATION: 84 MS
EKG QTC CALCULATION (BAZETT): 397 MS
EKG R AXIS: 7 DEGREES
EKG T AXIS: 19 DEGREES
EKG VENTRICULAR RATE: 63 BPM
ESTIMATED AVERAGE GLUCOSE: 119.8 MG/DL
GFR AFRICAN AMERICAN: >60
GFR NON-AFRICAN AMERICAN: >60
GLUCOSE BLD-MCNC: 93 MG/DL (ref 70–99)
HBA1C MFR BLD: 5.8 %
HCT VFR BLD CALC: 35.6 % (ref 36–48)
HDLC SERPL-MCNC: 62 MG/DL (ref 40–60)
HEMOGLOBIN: 11.6 G/DL (ref 12–16)
LDL CHOLESTEROL CALCULATED: 130 MG/DL
LV EF: 69 %
LVEF MODALITY: NORMAL
MCH RBC QN AUTO: 28.3 PG (ref 26–34)
MCHC RBC AUTO-ENTMCNC: 32.5 G/DL (ref 31–36)
MCV RBC AUTO: 87.2 FL (ref 80–100)
PDW BLD-RTO: 13.1 % (ref 12.4–15.4)
PLATELET # BLD: 177 K/UL (ref 135–450)
PMV BLD AUTO: 8.7 FL (ref 5–10.5)
POTASSIUM REFLEX MAGNESIUM: 3.8 MMOL/L (ref 3.5–5.1)
RBC # BLD: 4.09 M/UL (ref 4–5.2)
SODIUM BLD-SCNC: 140 MMOL/L (ref 136–145)
TRIGL SERPL-MCNC: 99 MG/DL (ref 0–150)
TROPONIN: <0.01 NG/ML
TROPONIN: <0.01 NG/ML
VLDLC SERPL CALC-MCNC: 20 MG/DL
WBC # BLD: 4 K/UL (ref 4–11)

## 2021-12-10 PROCEDURE — 96376 TX/PRO/DX INJ SAME DRUG ADON: CPT

## 2021-12-10 PROCEDURE — 93017 CV STRESS TEST TRACING ONLY: CPT

## 2021-12-10 PROCEDURE — 6370000000 HC RX 637 (ALT 250 FOR IP): Performed by: FAMILY MEDICINE

## 2021-12-10 PROCEDURE — 6360000002 HC RX W HCPCS: Performed by: NURSE PRACTITIONER

## 2021-12-10 PROCEDURE — 80061 LIPID PANEL: CPT

## 2021-12-10 PROCEDURE — 78452 HT MUSCLE IMAGE SPECT MULT: CPT

## 2021-12-10 PROCEDURE — A9502 TC99M TETROFOSMIN: HCPCS | Performed by: NURSE PRACTITIONER

## 2021-12-10 PROCEDURE — G0378 HOSPITAL OBSERVATION PER HR: HCPCS

## 2021-12-10 PROCEDURE — 2580000003 HC RX 258: Performed by: NURSE PRACTITIONER

## 2021-12-10 PROCEDURE — 83036 HEMOGLOBIN GLYCOSYLATED A1C: CPT

## 2021-12-10 PROCEDURE — 36415 COLL VENOUS BLD VENIPUNCTURE: CPT

## 2021-12-10 PROCEDURE — 93010 ELECTROCARDIOGRAM REPORT: CPT | Performed by: INTERNAL MEDICINE

## 2021-12-10 PROCEDURE — 96375 TX/PRO/DX INJ NEW DRUG ADDON: CPT

## 2021-12-10 PROCEDURE — 3430000000 HC RX DIAGNOSTIC RADIOPHARMACEUTICAL: Performed by: NURSE PRACTITIONER

## 2021-12-10 PROCEDURE — 85027 COMPLETE CBC AUTOMATED: CPT

## 2021-12-10 PROCEDURE — 80048 BASIC METABOLIC PNL TOTAL CA: CPT

## 2021-12-10 PROCEDURE — 84484 ASSAY OF TROPONIN QUANT: CPT

## 2021-12-10 PROCEDURE — 6370000000 HC RX 637 (ALT 250 FOR IP): Performed by: NURSE PRACTITIONER

## 2021-12-10 PROCEDURE — 96372 THER/PROPH/DIAG INJ SC/IM: CPT

## 2021-12-10 RX ORDER — LORAZEPAM 0.5 MG/1
0.5 TABLET ORAL EVERY 6 HOURS PRN
Status: DISCONTINUED | OUTPATIENT
Start: 2021-12-10 | End: 2021-12-11 | Stop reason: HOSPADM

## 2021-12-10 RX ORDER — LISINOPRIL 10 MG/1
20 TABLET ORAL 2 TIMES DAILY
COMMUNITY

## 2021-12-10 RX ORDER — ZOLPIDEM TARTRATE 10 MG/1
2.5 TABLET ORAL NIGHTLY PRN
COMMUNITY

## 2021-12-10 RX ORDER — AMLODIPINE BESYLATE 5 MG/1
5 TABLET ORAL DAILY
Status: DISCONTINUED | OUTPATIENT
Start: 2021-12-10 | End: 2021-12-11 | Stop reason: HOSPADM

## 2021-12-10 RX ORDER — BUTALBITAL, ACETAMINOPHEN AND CAFFEINE 50; 325; 40 MG/1; MG/1; MG/1
1 TABLET ORAL EVERY 4 HOURS PRN
Status: DISCONTINUED | OUTPATIENT
Start: 2021-12-10 | End: 2021-12-11 | Stop reason: HOSPADM

## 2021-12-10 RX ORDER — LORAZEPAM 0.5 MG/1
0.5 TABLET ORAL EVERY 6 HOURS PRN
COMMUNITY
Start: 2021-11-24 | End: 2021-12-24

## 2021-12-10 RX ORDER — LISINOPRIL 10 MG/1
10 TABLET ORAL 2 TIMES DAILY
Status: DISCONTINUED | OUTPATIENT
Start: 2021-12-10 | End: 2021-12-10

## 2021-12-10 RX ORDER — LISINOPRIL 10 MG/1
10 TABLET ORAL 2 TIMES DAILY
Status: DISCONTINUED | OUTPATIENT
Start: 2021-12-11 | End: 2021-12-11 | Stop reason: HOSPADM

## 2021-12-10 RX ADMIN — Medication 10 ML: at 08:59

## 2021-12-10 RX ADMIN — ATORVASTATIN CALCIUM 40 MG: 40 TABLET, FILM COATED ORAL at 22:04

## 2021-12-10 RX ADMIN — ONDANSETRON 4 MG: 2 INJECTION INTRAMUSCULAR; INTRAVENOUS at 18:21

## 2021-12-10 RX ADMIN — AMLODIPINE BESYLATE 5 MG: 5 TABLET ORAL at 18:17

## 2021-12-10 RX ADMIN — NITROGLYCERIN 1 INCH: 20 OINTMENT TOPICAL at 06:31

## 2021-12-10 RX ADMIN — LISINOPRIL 5 MG: 5 TABLET ORAL at 08:58

## 2021-12-10 RX ADMIN — BUTALBITAL, ACETAMINOPHEN, AND CAFFEINE 1 TABLET: 50; 325; 40 TABLET ORAL at 18:17

## 2021-12-10 RX ADMIN — REGADENOSON 0.4 MG: 0.08 INJECTION, SOLUTION INTRAVENOUS at 11:42

## 2021-12-10 RX ADMIN — Medication 10 ML: at 22:06

## 2021-12-10 RX ADMIN — PANTOPRAZOLE SODIUM 40 MG: 40 TABLET, DELAYED RELEASE ORAL at 13:10

## 2021-12-10 RX ADMIN — DOCUSATE SODIUM 100 MG: 100 CAPSULE ORAL at 13:09

## 2021-12-10 RX ADMIN — TETROFOSMIN 30 MILLICURIE: 1.38 INJECTION, POWDER, LYOPHILIZED, FOR SOLUTION INTRAVENOUS at 11:34

## 2021-12-10 RX ADMIN — ASPIRIN 81 MG: 81 TABLET, CHEWABLE ORAL at 08:58

## 2021-12-10 RX ADMIN — LORAZEPAM 0.5 MG: 0.5 TABLET ORAL at 11:48

## 2021-12-10 RX ADMIN — LORAZEPAM 0.5 MG: 0.5 TABLET ORAL at 22:04

## 2021-12-10 RX ADMIN — ONDANSETRON 4 MG: 2 INJECTION INTRAMUSCULAR; INTRAVENOUS at 11:23

## 2021-12-10 RX ADMIN — DOCUSATE SODIUM 100 MG: 100 CAPSULE ORAL at 22:04

## 2021-12-10 RX ADMIN — TETROFOSMIN 10 MILLICURIE: 1.38 INJECTION, POWDER, LYOPHILIZED, FOR SOLUTION INTRAVENOUS at 07:36

## 2021-12-10 RX ADMIN — ACETAMINOPHEN 650 MG: 325 TABLET ORAL at 09:02

## 2021-12-10 RX ADMIN — ENOXAPARIN SODIUM 40 MG: 100 INJECTION SUBCUTANEOUS at 08:59

## 2021-12-10 ASSESSMENT — PAIN DESCRIPTION - DESCRIPTORS
DESCRIPTORS: HEADACHE
DESCRIPTORS: HEADACHE

## 2021-12-10 ASSESSMENT — PAIN SCALES - GENERAL
PAINLEVEL_OUTOF10: 8
PAINLEVEL_OUTOF10: 7

## 2021-12-10 ASSESSMENT — PAIN DESCRIPTION - PAIN TYPE
TYPE: ACUTE PAIN
TYPE: ACUTE PAIN

## 2021-12-10 ASSESSMENT — PAIN DESCRIPTION - LOCATION
LOCATION: HEAD
LOCATION: HEAD

## 2021-12-10 NOTE — CARE COORDINATION
INITIAL CASE MANAGEMENT ASSESSMENT    Reviewed chart, met with patient to assess possible discharge needs. Explained Case Management role/services. Living Situation: Patient lives alone in a Condo with a level entry. ADLs: Independent     DME: [employer]    PT/OT Recs: none     Active Services: none     Transportation: Active /Grandson will transport     Medications: [facility] on [address]/No barriers    PCP: Flakita Chadwick MD      HD/PD: N/A    PLAN/COMMENTS: Patient plans to return to home. She has arranged for her Grandson to stay with her for awhile. She has recently moved and he is going to help her get settled. SW/CM provided contact information for patient or family to call with any questions. SW/CM will follow and assist as needed.   Electronically signed by Sagar Gagnon RN on 12/10/2021 at 3:12 PM

## 2021-12-10 NOTE — PROGRESS NOTES
Nuclear medicine called and stated that they were unable to perform second part of stress test d/t pt vomiting and retching. Pt very anxious, MD messaged.

## 2021-12-10 NOTE — PROGRESS NOTES
Pt refused nitropaste . Pt states it makes her head hurt. MD messaged. VSS. Call light within reach. Will continue to monitor.

## 2021-12-10 NOTE — PROGRESS NOTES
Nuclear medicine called, Pt shaking and very anxious. This RN Arrived to nuclear med room to access pt and administered ativan per mar. Writer instructed relaxed breathing technique and Nuclear med RN applied cold compress. Pt calmed down and Nuclear med RN assumed care of Pt.

## 2021-12-10 NOTE — ED NOTES
ED SBAR report provider to Aruna Smith RN. Patient to be transported to Room 4121 at [facility] via [address] by [facility] [place]. Patient transported with bedside cardiac monitor. IV site clean, dry, and intact. MEWS score and pain assessed as 0/10 and documented. Updated patient on plan of care.        Sara Greco RN  12/09/21 [id number]

## 2021-12-10 NOTE — H&P
Hospital Medicine History & Physical      PCP: Iain Frances MD    Date of Admission: 12/9/2021    Date of Service: Pt seen/examined on 12/9/2021 and Placed in Observation. Chief Complaint:  High blood pressure, chest pressure      History Of Present Illness:      82 y.o. female with PMHx of Skin Cancer, GERD, HTN and HLD presented to [facility] in transfer from [facility] for evaluation of chest pain. Patient presented to [facility] emergency department for high blood pressure and left arm and jaw discomfort. Patient reports her blood pressure being high for the last few days. She has been experiencing an ache in her left jaw with some discomfort going down her left arm. That has been constant for the last 1 to 2 days. She has been exerting herself quite a bit as she is moving. She has noticed increased shortness of breath with exertion over the last week as well. She denies any chest pain. She does have a discomfort to the left rib area only with movement and she states this is because of all of the heavy lifting. Patient is having no pain at this time.     Past Medical History:          Diagnosis Date    Anesthesia complication     states under general anesthesia has trouble awaking    Arthritis     fibramyalgia, osteo, DJD    Cancer ([address]     skin cancer removed from left arm    GERD (gastroesophageal reflux disease)     Nausea & vomiting     Psoriasis     scalp, front of legs and back of arms    Unspecified cerebral artery occlusion with cerebral infarction 5 years ago    several TIA       Past Surgical History:          Procedure Laterality Date    BUNIONECTOMY Right     EXAM UNDER GENERAL ANESTHESIA OF NASOPHARYNX      HAND SURGERY Right     related to arthritis    NOSE SURGERY  4 years ago    1) nasal fracture and 2nd) fungal infection removed    SKIN CANCER EXCISION Left 2014       Medications Prior to Admission:      Prior to Admission medications Medication Sig Start Date End Date Taking? Authorizing Provider   docusate sodium (COLACE) 100 MG capsule Take 1 capsule by mouth 2 times daily 11/9/19  Yes Mary Whyte DO   atorvastatin (LIPITOR) 40 MG tablet Take 1 tablet by mouth nightly 2/8/19  Yes Shar Chung MD   lisinopril (PRINIVIL;ZESTRIL) 5 MG tablet Take 1 tablet by mouth daily 2/9/19  Yes Shar Chung MD   omeprazole (PRILOSEC) 40 MG delayed release capsule take one capsule by mouth daily 1/15/19  Yes Historical Provider, MD   vitamin D (ERGOCALCIFEROL) 01573 units CAPS capsule Take 1 capsule by mouth once a week Friday 4/20/18  Yes Historical Provider, MD   aspirin-acetaminophen-caffeine ([place] 8057) 225-413-26 MG per tablet Take 1 tablet by mouth 2 times daily    Yes Historical Provider, MD   dicyclomine (BENTYL) 10 MG capsule Take 1 capsule by mouth 4 times daily as needed (Abdominal pain or cramping) 6/4/21 6/9/21  Falguni Hernandez DO   acetaminophen (APAP EXTRA STRENGTH) 500 MG tablet Take 2 tablets by mouth every 6 hours as needed for Pain 3/2/21   Alejandra Schlatter, PA-C   albuterol sulfate  (90 Base) MCG/ACT inhaler Inhale 2 puffs into the lungs every 6 hours as needed for Shortness of Breath  2/26/18   Historical Provider, MD   terconazole (TERAZOL 7) 0.4 % vaginal cream Place 1 applicator vaginally as needed (irritation)  10/4/17   Historical Provider, MD   Multiple Vitamins-Minerals (THERAPEUTIC MULTIVITAMIN-MINERALS) tablet Take 1 tablet by mouth daily. Historical Provider, MD       Allergies:  Sulfa antibiotics and Morphine    Social History:      The patient currently lives alone    TOBACCO:   reports that she has quit smoking. She has never used smokeless tobacco.  ETOH:   reports no history of alcohol use.       Family History:      Reviewed in detail positive as follows:        Problem Relation Age of Onset    Asthma Mother     Heart Disease Mother     Cancer Mother     Heart Disease Urinalysis:      Lab Results   Component Value Date    NITRU Negative 06/04/2021    WBCUA 4 06/04/2021    RBCUA 2 06/04/2021    BLOODU Negative 06/04/2021    SPECGRAV 1.018 06/04/2021    GLUCOSEU Negative 06/04/2021    GLUCOSEU NEGATIVE 09/14/2011       Radiology:     EKG: Per ED review: Normal sinus rhythm, ventricular rate 63. No ST elevation. XR CHEST PORTABLE   Final Result   1. No acute cardiopulmonary disease. CT Head WO Contrast   Final Result      No acute findings in the head/brain. NM MYOCARDIAL SPECT REST EXERCISE OR RX    (Results Pending)       ASSESSMENT:    Active Hospital Problems    Diagnosis Date Noted    Chest pain [R07.9] 12/09/2021         PLAN:    Chest pain  - ECG shows no ST/T abnormalities  - troponins neg, will trend x 2 more draws  - ASA, statin  - NTG top q 6 and SL prn chest pain  - will obtain ECG if chest pain recurrs  - stress test in am, exercise able  - check lipid panel     HTN  - stable  - cont home meds    HLD  - continue statin     DVT Prophylaxis: Lovenox  Diet: ADULT DIET; Regular; No Caffeine  Code Status: Full Code    Dispo - Observation       JUANITO Lam, APRN - CNP    Thank you Rusty Chamberlain MD for the opportunity to be involved in this patient's care. If you have any questions or concerns please feel free to contact me at [phone].

## 2021-12-10 NOTE — PROGRESS NOTES
Medication Reconciliation    List of medications for Shahida Castro is currently taking is complete. Source of Information:   Epic records  Conversation with patient at bedside  Patient provided medication list     Allergies  Allergy list not thoroughly reviewed with patient at this time  Allergies listed in Epic as follows: Sulfa antibiotics and Morphine    Notes Regarding Home Medications:    Added PRN lorazepam, PRN zolpidem, and morning laxative  Removed atorvastatin  Only uses 1/2 of a zolpidem tablet at a time  Patient believes she splits a 5 mg tablet, however, a 10 mg tablet was last filled according to [place]  Using docusate TID      Ted Ravi [facility], PharmD   12/10/2021 9:49 AM

## 2021-12-10 NOTE — ED TRIAGE NOTES
Patient presents to ED complaining of high blood pressure which started on Sunday. Reports moving homes and doing a lot of stair climbing lately. Reports headache which started yesterday, pressure in left arm and neck earlier today which has resolved. Patient also reports difficulty moving right hand earlier today which has resolved. Hx of TIAs Patient denies any chest pain to this RN on arrival. Denies shortness of breath. Baseline NIHSS = 0. Patient resting on bed, respirations even and easy at this time. No obvious distress. The patient is a 34y Male complaining of

## 2021-12-10 NOTE — PROGRESS NOTES
Hospitalist Progress Note    CC: <principal problem not specified>      Admit date: 12/9/2021  Days in hospital:  0    Subjective/interval history: Pt S/E. Pt had nausea and vomiting with anxiety while in the stress lab and had to stop this am. She was not given her chronic ativan and feels like she having withdrawals. She was able to calm down after ativan, antiemetics and complete the stress test.   She now c/o a headache  O2 status: room air    ROS:   Pertinent items are noted in HPI. Objective:    /71   Pulse 61   Temp 98.1 °F (36.7 °C) (Oral)   Resp 16   Ht 4' 11\" (1.499 m)   Wt 138 lb 0.1 oz (62.6 kg)   SpO2 96%   BMI 27.87 kg/m²     Gen: alert, NAD  HEENT: NC/AT, moist mucous membranes, no oropharyngeal erythema or exudate  Neck: supple, trachea midline, no anterior cervical or SC LAD  Heart: Normal s1/s2, RRR, no murmurs, gallops, or rubs. Pain under her left breast reproduced with pressure  Lungs: clear bilaterally, no wheezing, no rales, no rhonchi, no use of accessory muscles  Abd: bowel sounds present, soft, nontender, nondistended, no masses  Extrem: No clubbing, cyanosis, no edema  Skin: no rashes or lesions  Psych: A & O x3, affect appropriate  Neuro: grossly intact, moves all four extremities spontaneously.   Cap refill: +2 sec    Medications:  Scheduled Meds:   amLODIPine  5 mg Oral Daily    atorvastatin  40 mg Oral Nightly    docusate sodium  100 mg Oral BID    lisinopril  5 mg Oral Daily    pantoprazole  40 mg Oral QAM AC    vitamin D  50,000 Units Oral Weekly    sodium chloride flush  5-40 mL IntraVENous 2 times per day    aspirin  81 mg Oral Daily    enoxaparin  40 mg SubCUTAneous Daily    nitroglycerin  1 inch Topical 4 times per day       PRN Meds:  promethazine, LORazepam, butalbital-acetaminophen-caffeine, sodium chloride flush, sodium chloride, ondansetron **OR** ondansetron, acetaminophen **OR** acetaminophen, polyethylene glycol, nitroGLYCERIN    IV:   sodium chloride           Intake/Output Summary (Last 24 hours) at 12/10/2021 1842  Last data filed at 12/9/2021 2349  Gross per 24 hour   Intake 120 ml   Output    Net 120 ml       Results:  CBC:   Recent Labs     12/09/21  1733 12/10/21  0627   WBC 5.0 4.0   HGB 12.4 11.6*   HCT 37.1 35.6*   MCV 87.0 87.2    177     BMP:   Recent Labs     12/09/21  1733 12/10/21  0627    140   K 3.9 3.8    105   CO2 24 24   BUN 16 13   CREATININE 0.7 0.7     Mag: No results for input(s): MAG in the last 72 hours. Phos: No results found for: PHOS  No components found for: GLU    LIVER PROFILE:   Recent Labs     12/09/21 1733   AST 18   ALT 14   BILITOT <0.2   ALKPHOS 65     PT/INR: No results for input(s): PROTIME, INR in the last 72 hours. APTT: No results for input(s): APTT in the last 72 hours. UA:No results for input(s): NITRITE, COLORU, PHUR, LABCAST, WBCUA, RBCUA, MUCUS, TRICHOMONAS, YEAST, BACTERIA, CLARITYU, SPECGRAV, LEUKOCYTESUR, UROBILINOGEN, BILIRUBINUR, BLOODU, GLUCOSEU, AMORPHOUS in the last 72 hours. Invalid input(s): Eleanor Peña input(s): ABG  Lab Results   Component Value Date    CALCIUM 8.9 12/10/2021       Assessment:    Active Problems:    Chest pain  Resolved Problems:    * No resolved hospital problems. [facility] course: 82 y.o. female with h/o HTN and HLD presented to [facility] in transfer from [facility] for evaluation of chest pain. She reports that her bp has been high over the past week. has been exerting herself quite a bit as she is moving. She has noticed increased shortness of breath with exertion over the last week as well. She does have a discomfort to the left rib area only with movement and she states this is because of all of the heavy lifting. When I saw her today, patient reports that her chest pain had resolved, and told me it was located under her left breast, worse with movement.     Plan:  Chest pain, atypical for angina  - ECG shows no ischemic changes  - troponins neg x3  - ASA, statin  - will obtain ECG if chest pain recurrs  - stress test negative for ischemia     HTN  - mildly elevated  - cont lisinopril  - add amlodipine    Chronic conditions - continue home meds unless otherwise stated    Code status:  full  DVT prophylaxis: [x] Lovenox  [] SQ Heparin  [] SCDs because of  [] warfarin/oral direct thrombin inhibitor [] Encourage ambulation      Disposition:  [] Home [] Rehab [] Psych [] SNF  [] LTAC  [] Transfer to ICU  [] Transfer to PCU [] Other: obs, dc tomorrow      Electronically signed by Emelyn Jane DO on 12/10/2021 at 6:42 PM

## 2021-12-10 NOTE — ACP (ADVANCE CARE PLANNING)
Advance Care Planning     Advance Care Planning Activator (Inpatient)  Conversation Note      Date of ACP Conversation: 12/10/2021     Conversation Conducted with: Patient with Decision Making Capacity    ACP Activator: Mindi Munoz RN    Health Care Decision Maker:     Current Designated Health Care Decision Maker:     Primary Decision Maker: Aniyah Peters Child - [phone]    Care Preferences    Ventilation: \"If you were in your present state of health and suddenly became very ill and were unable to breathe on your own, what would your preference be about the use of a ventilator (breathing machine) if it were available to you? \"      Would the patient desire the use of ventilator (breathing machine)?: yes    \"If your health worsens and it becomes clear that your chance of recovery is unlikely, what would your preference be about the use of a ventilator (breathing machine) if it were available to you? \"     Would the patient desire the use of ventilator (breathing machine)?: No      Resuscitation  \"CPR works best to restart the heart when there is a sudden event, like a heart attack, in someone who is otherwise healthy. Unfortunately, CPR does not typically restart the heart for people who have serious health conditions or who are very sick. \"    \"In the event your heart stopped as a result of an underlying serious health condition, would you want attempts to be made to restart your heart (answer \"yes\" for attempt to resuscitate) or would you prefer a natural death (answer \"no\" for do not attempt to resuscitate)? \" yes       [] Yes   [x] No   Educated Patient / Clearence Golds regarding differences between Advance Directives and portable DNR orders.     Length of ACP Conversation in minutes:  5    Conversation Outcomes:  [x] ACP discussion completed  [] Existing advance directive reviewed with patient; no changes to patient's previously recorded wishes  [] New Advance Directive completed  [] Portable Do Not Rescitate prepared for Provider review and signature  [] POLST/POST/MOLST/MOST prepared for Provider review and signature      Follow-up plan:    [] Schedule follow-up conversation to continue planning  [] Referred individual to Provider for additional questions/concerns   [] Advised patient/agent/surrogate to review completed ACP document and update if needed with changes in condition, patient preferences or care setting    [x] This note routed to one or more involved healthcare providers  Electronically signed by Ward Oviedo RN on 12/10/2021 at 3:15 PM

## 2021-12-10 NOTE — ED NOTES
[facility] at bedside for patient transport to [facility] at this time. Report to EMT-P at this time.      Froylan Martinez RN  12/09/21 [id number]

## 2021-12-11 VITALS
HEIGHT: 59 IN | TEMPERATURE: 98.1 F | SYSTOLIC BLOOD PRESSURE: 117 MMHG | DIASTOLIC BLOOD PRESSURE: 71 MMHG | WEIGHT: 139.33 LBS | RESPIRATION RATE: 16 BRPM | HEART RATE: 58 BPM | BODY MASS INDEX: 28.09 KG/M2 | OXYGEN SATURATION: 94 %

## 2021-12-11 PROCEDURE — 94761 N-INVAS EAR/PLS OXIMETRY MLT: CPT

## 2021-12-11 PROCEDURE — 6370000000 HC RX 637 (ALT 250 FOR IP): Performed by: FAMILY MEDICINE

## 2021-12-11 PROCEDURE — 6370000000 HC RX 637 (ALT 250 FOR IP): Performed by: NURSE PRACTITIONER

## 2021-12-11 PROCEDURE — 6360000002 HC RX W HCPCS: Performed by: NURSE PRACTITIONER

## 2021-12-11 PROCEDURE — G0378 HOSPITAL OBSERVATION PER HR: HCPCS

## 2021-12-11 PROCEDURE — 2580000003 HC RX 258: Performed by: NURSE PRACTITIONER

## 2021-12-11 PROCEDURE — 96372 THER/PROPH/DIAG INJ SC/IM: CPT

## 2021-12-11 RX ORDER — ATORVASTATIN CALCIUM 10 MG/1
10 TABLET, FILM COATED ORAL NIGHTLY
Status: DISCONTINUED | OUTPATIENT
Start: 2021-12-11 | End: 2021-12-11 | Stop reason: HOSPADM

## 2021-12-11 RX ADMIN — LORAZEPAM 0.5 MG: 0.5 TABLET ORAL at 06:45

## 2021-12-11 RX ADMIN — BUTALBITAL, ACETAMINOPHEN, AND CAFFEINE 1 TABLET: 50; 325; 40 TABLET ORAL at 11:09

## 2021-12-11 RX ADMIN — ENOXAPARIN SODIUM 40 MG: 100 INJECTION SUBCUTANEOUS at 08:30

## 2021-12-11 RX ADMIN — DOCUSATE SODIUM 100 MG: 100 CAPSULE ORAL at 08:29

## 2021-12-11 RX ADMIN — PANTOPRAZOLE SODIUM 40 MG: 40 TABLET, DELAYED RELEASE ORAL at 06:44

## 2021-12-11 RX ADMIN — Medication 10 ML: at 11:10

## 2021-12-11 RX ADMIN — LISINOPRIL 10 MG: 10 TABLET ORAL at 08:29

## 2021-12-11 RX ADMIN — ASPIRIN 81 MG: 81 TABLET, CHEWABLE ORAL at 08:30

## 2021-12-11 ASSESSMENT — PAIN SCALES - GENERAL
PAINLEVEL_OUTOF10: 9
PAINLEVEL_OUTOF10: 0

## 2021-12-11 NOTE — PROGRESS NOTES
Pt c/o heart palpitations. VSS. Lawernce Roughen Bp 117/71 HR 55 o2 96. Pt calm and resting comfortably in bed. She states she thinks its from bp meds it happens when they get changed. Telemonitor on. Call light within reach. Will continue to monitor. MD messaged.

## 2021-12-11 NOTE — DISCHARGE SUMMARY
Hospital Medicine Discharge Summary    Patient: Joshua Inman     Gender: female  : 1935   Age: 82 y.o. MRN: [id number]    Code Status: Full Code     Primary Care Provider: Ren Healy MD    Admit Date: 2021   Discharge Date:   2021    Admitting Physician: Pedro Gomez MD  Discharge Physician: Steve Lee DO     Discharge Diagnoses: Active Hospital Problems    Diagnosis Date Noted    Chest pain [R07.9] 2021       Hospital Course:  82 y. o. female with h/o HTN and HLD presented to [facility] in transfer [facility] for evaluation of chest pain. She reports that her bp has been high over the past week. has been exerting herself quite a bit as she is moving. Marylen Harden has noticed increased shortness of breath with exertion over the last week as well. She does have a discomfort to the left rib area only with movement and she states this is because of all of the heavy lifting. When I saw her today, patient reports that her chest pain had resolved, and told me it was located under her left breast, worse with movement. Pt had nausea and vomiting with anxiety while in the stress lab and had to stop. She was not given her chronic ativan and felt like she having withdrawals. She was able to calm down after ativan, antiemetics and complete the stress test.     She states she takes Excedrin daily, and knows that it can cause renal dysfunction and gerd or ulcers. Work up completed, and improved with treatment as below. was discharged today in stable condition.      Chest pain, atypical for angina  - ECG shows no ischemic changes  - ASA, statin  - stress test negative for ischemia  - troponins negative      HTN  - mildly elevated, but she told me that she hasn't been taking her lisinopril 10 mg bid, sh has been taking 5 mg bid  - cont lisinopril as ordered    Disposition:  Home    Exam:     /71   Pulse 58   Temp 98.1 °F (36.7 °C) (Oral)   Resp 16 Ht 4' 11\" (1.499 m)   Wt 139 lb 5.3 oz (63.2 kg)   SpO2 94%   BMI 28.14 kg/m²     General appearance:  No apparent distress, appears stated age and cooperative. HEENT:  Normal cephalic, atraumatic without obvious deformity. Pupils equal, round, and reactive to light. Extra ocular muscles intact. Conjunctivae/corneas clear. Neck: Supple, with full range of motion. No jugular venous distention. Trachea midline. Respiratory:  Normal respiratory effort. Clear to auscultation, bilaterally without Rales/Wheezes/Rhonchi. Cardiovascular:  Regular rate and rhythm with normal S1/S2 without murmurs, rubs or gallops. Abdomen: Soft, non-tender, non-distended with normal bowel sounds. Musculoskeletal:  No clubbing, cyanosis or edema bilaterally. Full range of motion without deformity. Skin: Skin color, texture, turgor normal.  No rashes or lesions. Neurologic:  Neurovascularly intact without any focal sensory/motor deficits. Cranial nerves: II-XII intact, grossly non-focal.  Psychiatric:  Alert and oriented, thought content appropriate, normal insight    Consults:     IP CONSULT TO HOSPITALIST    Labs: For convenience and continuity at follow-up the following most recent labs are provided:    Lab Results   Component Value Date    WBC 4.0 12/10/2021    HGB 11.6 12/10/2021    HCT 35.6 12/10/2021    MCV 87.2 12/10/2021     12/10/2021     12/10/2021    K 3.8 12/10/2021     12/10/2021    CO2 24 12/10/2021    BUN 13 12/10/2021    CREATININE 0.7 12/10/2021    CALCIUM 8.9 12/10/2021    ALKPHOS 65 12/09/2021    ALT 14 12/09/2021    AST 18 12/09/2021    BILITOT <0.2 12/09/2021    LABALBU 4.2 12/09/2021    LDLCALC 130 12/10/2021    TRIG 99 12/10/2021     No results found for: INR    Radiology:  NM MYOCARDIAL SPECT REST EXERCISE OR RX   Final Result      XR CHEST PORTABLE   Final Result   1. No acute cardiopulmonary disease.          CT Head WO Contrast   Final Result      No acute findings in the head/brain. Discharge Medications:   Current Discharge Medication List        Current Discharge Medication List        Current Discharge Medication List      CONTINUE these medications which have NOT CHANGED    Details   lisinopril (PRINIVIL;ZESTRIL) 10 MG tablet Take 10 mg by mouth 2 times daily      LORazepam (ATIVAN) 0.5 MG tablet Take 0.5 mg by mouth every 6 hours as needed for Anxiety. bisacodyl (DULCOLAX) 5 MG EC tablet Take 5 mg by mouth every morning      zolpidem (AMBIEN) 10 MG tablet Take 5 mg by mouth nightly as needed for Sleep.      docusate sodium (COLACE) 100 MG capsule Take 1 capsule by mouth 2 times daily  Qty: 30 capsule, Refills: 0      omeprazole (PRILOSEC) 40 MG delayed release capsule take one capsule by mouth daily  Refills: 1      vitamin D (ERGOCALCIFEROL) 30991 units CAPS capsule Take 1 capsule by mouth once a week Friday      albuterol sulfate  (90 Base) MCG/ACT inhaler Inhale 2 puffs into the lungs every 6 hours as needed for Shortness of Breath       Multiple Vitamins-Minerals (THERAPEUTIC MULTIVITAMIN-MINERALS) tablet Take 1 tablet by mouth daily. Current Discharge Medication List      STOP taking these medications       dicyclomine (BENTYL) 10 MG capsule Comments:   Reason for Stopping:         acetaminophen (APAP EXTRA STRENGTH) 500 MG tablet Comments:   Reason for Stopping:         atorvastatin (LIPITOR) 40 MG tablet Comments:   Reason for Stopping:         terconazole (TERAZOL 7) 0.4 % vaginal cream Comments:   Reason for Stopping:         aspirin-acetaminophen-caffeine ([place] 8057) 727-965-67 MG per tablet Comments:   Reason for Stopping: Follow-up appointments:  one week    Provider Follow-up:    pcp    Condition at Discharge:  Stable    The patient was seen and examined on day of discharge and this discharge summary is in conjunction with any daily progress note from day of discharge. Time Spent on discharge is 45 minutes  in the examination, evaluation, counseling and review of medications and discharge plan. Signed:    Jama Yoo DO   12/11/2021      Thank you Surya Gutiérrez MD for the opportunity to be involved in this patient's care. If you have any questions or concerns please feel free to contact me at [phone].

## 2021-12-11 NOTE — PROGRESS NOTES
Discussed discharge instructions with pt and grandson at bedside. All questions answered. IV removed w/o complication and dressing applied. Pt transported to discharge via wheelchair.

## 2022-01-03 ENCOUNTER — HOSPITAL ENCOUNTER (EMERGENCY)
Age: 87
Discharge: HOME | End: 2022-01-03
Attending: STUDENT IN AN ORGANIZED HEALTH CARE EDUCATION/TRAINING PROGRAM
Payer: MEDICARE

## 2022-01-03 ENCOUNTER — APPOINTMENT (OUTPATIENT)
Dept: GENERAL RADIOLOGY | Age: 87
End: 2022-01-03
Payer: MEDICARE

## 2022-01-03 VITALS
HEART RATE: 103 BPM | OXYGEN SATURATION: 99 % | RESPIRATION RATE: 16 BRPM | BODY MASS INDEX: 26.49 KG/M2 | SYSTOLIC BLOOD PRESSURE: 148 MMHG | HEIGHT: 59 IN | DIASTOLIC BLOOD PRESSURE: 83 MMHG | TEMPERATURE: 98 F | WEIGHT: 131.39 LBS

## 2022-01-03 DIAGNOSIS — J06.9 VIRAL URI WITH COUGH: ICD-10-CM

## 2022-01-03 DIAGNOSIS — Z20.822 COVID-19 RULED OUT: ICD-10-CM

## 2022-01-03 DIAGNOSIS — R05.9 COUGH: Primary | ICD-10-CM

## 2022-01-03 PROCEDURE — U0005 INFEC AGEN DETEC AMPLI PROBE: HCPCS

## 2022-01-03 PROCEDURE — 71045 X-RAY EXAM CHEST 1 VIEW: CPT

## 2022-01-03 PROCEDURE — 99283 EMERGENCY DEPT VISIT LOW MDM: CPT

## 2022-01-03 PROCEDURE — U0003 INFECTIOUS AGENT DETECTION BY NUCLEIC ACID (DNA OR RNA); SEVERE ACUTE RESPIRATORY SYNDROME CORONAVIRUS 2 (SARS-COV-2) (CORONAVIRUS DISEASE [COVID-19]), AMPLIFIED PROBE TECHNIQUE, MAKING USE OF HIGH THROUGHPUT TECHNOLOGIES AS DESCRIBED BY CMS-2020-01-R: HCPCS

## 2022-01-03 ASSESSMENT — PAIN DESCRIPTION - PAIN TYPE
TYPE: ACUTE PAIN
TYPE: ACUTE PAIN

## 2022-01-03 ASSESSMENT — PAIN SCALES - GENERAL
PAINLEVEL_OUTOF10: 7
PAINLEVEL_OUTOF10: 0

## 2022-01-03 ASSESSMENT — PAIN DESCRIPTION - LOCATION: LOCATION: BACK

## 2022-01-03 ASSESSMENT — PAIN - FUNCTIONAL ASSESSMENT: PAIN_FUNCTIONAL_ASSESSMENT: ACTIVITIES ARE NOT PREVENTED

## 2022-01-03 ASSESSMENT — PAIN DESCRIPTION - ORIENTATION: ORIENTATION: LEFT

## 2022-01-03 ASSESSMENT — PAIN DESCRIPTION - ONSET: ONSET: ON-GOING

## 2022-01-03 ASSESSMENT — PAIN DESCRIPTION - DESCRIPTORS: DESCRIPTORS: DISCOMFORT

## 2022-01-03 NOTE — ED PROVIDER NOTES
Primary Care Physician: Raeann Paris MD   Attending Physician: No att. providers found     History   Chief Complaint   Patient presents with    Cough     Pt states she has had a cough for several days. Today had increased nasal drainage which concerned her. Pt states she recently had pneumonia and was exposed to [place] over Christmas. BBS clear slightly diminished bilat at bases. HPI   Justin Harris  is a 82 y.o. female history of fibromyalgia, who presents this morning complaining of a cough concerning for Covid. Patient believes that she has been exposed to COVID-19 from her son. Stated that the son was exposed to Covid around 25 December. She is also concerned because she was recently seen here and diagnosed with pneumonia and spent 3 days in the hospital.  She does have a cough and some soreness around her throat. She stated she has been vaccinated x2.     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 ago    several TIA        Past Surgical History:   Procedure Laterality Date    BUNIONECTOMY Right     EXAM UNDER GENERAL ANESTHESIA OF NASOPHARYNX      HAND SURGERY Right     related to arthritis    NOSE SURGERY  4 years ago    1) nasal fracture and 2nd) fungal infection removed    SKIN CANCER EXCISION Left 2014        Family History   Problem Relation Age of Onset    Asthma Mother     Heart Disease Mother     Cancer Mother     Heart Disease Father     Early Death Brother     Cancer Brother         esophogeal cancer    Asthma Brother     Heart Disease Brother         Social History Socioeconomic History    Marital status:      Spouse name: None    Number of children: None    Years of education: None    Highest education level: None   Occupational History    None   Tobacco Use    Smoking status: Former Smoker    Smokeless tobacco: Never Used    Tobacco comment: smokes as a teenager quit when she was 23years old   Vaping Use    Vaping Use: Never used   Substance and Sexual Activity    Alcohol use: No    Drug use: No    Sexual activity: Not Currently     Partners: Male   Other Topics Concern    None   Social History Narrative    None     Social Determinants of Health     Financial Resource Strain:     Difficulty of Paying Living Expenses: Not on file   Food Insecurity:     Worried About Running Out of Food in the Last Year: Not on file    Rosy of Food in the Last Year: Not on file   Transportation Needs:     Lack of Transportation (Medical): Not on file    Lack of Transportation (Non-Medical):  Not on file   Physical Activity:     Days of Exercise per Week: Not on file    Minutes of Exercise per Session: Not on file   Stress:     Feeling of Stress : Not on file   Social Connections:     Frequency of Communication with Friends and Family: Not on file    Frequency of Social Gatherings with Friends and Family: Not on file    Attends Hoahaoism Services: Not on file    Active Member of [address] [place] [address] or Organizations: Not on file    Attends Club or Organization Meetings: Not on file    Marital Status: Not on file   Intimate Partner Violence:     Fear of Current or Ex-Partner: Not on file    Emotionally Abused: Not on file    Physically Abused: Not on file    Sexually Abused: Not on file   Housing Stability:     Unable to Pay for Housing in the Last Year: Not on file    Number of [place] in the Last Year: Not on file    Unstable Housing in the Last Year: Not on file        Review of Systems   10 total systems reviewed and found to be negative unless otherwise noted in HPI     Physical Exam   BP (!) 148/83   Pulse 103   Temp 98 °F (36.7 °C) (Oral)   Resp 16   Ht 4' 10.5\" (1.486 m)   Wt 131 lb 6.3 oz (59.6 kg)   SpO2 99%   BMI 26.99 kg/m²      CONSTITUTIONAL: Well appearing, in no acute distress   HEAD: atraumatic, normocephalic   EYES: PERRL, No injection, discharge or scleral icterus. ENT: Moist mucous membranes. NECK: Normal ROM, NO LAD   CARDIOVASCULAR: Regular rate and rhythm. No murmurs or gallop. PULMONARY/CHEST: Airway patent. No retractions. Breath sounds clear with good air entry bilaterally. ABDOMEN: Soft, Non-distended and non-tender, without guarding or rebound. SKIN: Acyanotic, warm, dry   MUSCULOSKELETAL: No swelling, tenderness or deformity   NEUROLOGICAL: Awake and oriented x 3. Pulses intact. Grossly nonfocal   Nursing note and vitals reviewed. ED Course & Medical Decision Making   Medications - No data to display   Labs Reviewed   COVID-19      XR CHEST PORTABLE   Final Result   No radiographic evidence of acute pulmonary disease. CT Head WO Contrast    Result Date: 12/9/2021  EXAM: CT Head Without Intravenous Contrast EXAM DATE/TIME: 12/9/2021 5:36 pm CLINICAL HISTORY: ORDERING SYSTEM PROVIDED  dizziness, hypertension, no headache  TECHNOLOGIST PROVIDED HISTORY:  Reason for exam:->dizziness, hypertension, no headache Has a \"code stroke\" or \"stroke alert\" been called? ->No  Decision Support Exception - unselect if not a suspected or confirmed emergency medical condition->Emergency Medical Condition (MA)  Reason for Exam: PT. C/O ELEVATED B/P X 4 DAYS AGO, HA STARTED YESTERDAY, LEFT ARM AND NECK PAIN STARTED TODAY, RT.  HAND DIFFICULTY MOVING TODAY  Relevant Medical/Surgical History: HX TIA'S TECHNIQUE: Axial computed tomography images of the head/brain without intravenous contrast.  This CT exam was performed using one or more of the following dose reduction techniques:  automated exposure control, adjustment of the mA and/or kV according to patient size, and/or use of iterative reconstruction technique. COMPARISON: No relevant prior studies available. FINDINGS: Brain and extra-axial spaces:  No acute findings. No hemorrhage. No significant white matter disease. No edema. No ventriculomegaly. Bones/joints:  Evidence of prior craniotomy in the left posterolateral parietal bone. No acute fracture. Soft tissues:  No acute findings. Sinuses:  Evidence of prior sinus surgery with minimal left sphenoid chronic sinusitis. Mastoid air cells:  Unremarkable as visualized. No mastoid effusion. No acute findings in the head/brain. XR CHEST PORTABLE    Result Date: 12/9/2021  EXAMINATION: ONE X-RAY VIEW OF THE CHEST 12/9/2021 6:19 pm COMPARISON: 07/02/2019 HISTORY: ORDERING SYSTEM PROVIDED HISTORY: chest pain TECHNOLOGIST PROVIDED HISTORY: Reason for exam:->chest pain Reason for Exam: PT. C/O ELEVATED B/P X 4 DAYS, WITH RADIATING LT. ARM AND NECK PAIN  STARTED TODAY, HA STARTED YESTERDAY, RT. HAND DIFFICULT MOVING TODAY Relevant Medical/Surgical History: HX TIA'S HX SKIN CA FINDINGS: The cardiac silhouette is at the upper limits of normal in size. Mediastinal contours are normal.  The lungs are clear. No pleural effusion or pneumothorax. The visualized osseous structures are unremarkable. 1. No acute cardiopulmonary disease.      NM MYOCARDIAL SPECT REST EXERCISE OR RX    Result Date: 12/10/2021  Cardiac Perfusion Imaging  Demographics   Patient Name       [facility]   Date of Study      12/10/2021         Gender              Female   Patient Number     [id number]         Date of Birth       1935   Visit Number       [id number]          Age                 82 year(s)   Accession Number   [id number]         Room Number         6761   Corporate ID       [id number]            NM Technician       Viridiana Cedillo, RT                                                            Zaheer Millss   Nurse Jose Ramon Son,   Interpreting        [facility]                 Physician           Jake Decker MD   Ordering Physician Ryan Richey CNP   The procedure was explained in detail to the patient. Risks,  complications and alternative treatments were reviewed. Written consent  was obtained. Procedure Procedure Type:   Nuclear Stress Test:NM MYOCARDIAL SPECT REST EXERCISE OR RX   Study location: [facility] - Nuclear Medicine   Indications: Chest pain. Hospital Status: Outpatient. Height: 59 inches Weight: 138 pounds  Risk Factors   The patient risk factors include:obesity, physical activity, former tobacco  use, treated hypertension, chronic lung disease and (pack years: 10 years  not smokin). Conclusions   Summary  Pharmacological Stress/MPI Results:   1. Technically a satisfactory study. 2. No evidence of Ischemia by Myocardial Perfusion Imaging. 3. Gated Study shows normal LV size and Systolic function; EF is 69 %. Stress Protocols   Resting ECG  Normal sinus rhythm with normal ST segment. Resting HR:62 bpm     Resting BP:136/90 mmHg   Pre-stress physical exam: Patient had  baseline chest discomfort 2/10. Heart and  lung sounds unremarkable. Troponin T neg x  2. O2 sat on room air 97%. Adult Plus BP  cuff used. To proceed with GXT Myoview  stress test. Patient had extreme nausea and  vomiting prior to stress test and received  Zofran 4 mg IVP. Stress Protocol:Pharmacologic - Lexiscan's  Peak HR:131 bpm                  HR/BP product:[id number]  Peak BP:188/130 mmHg  Predicted HR: 136 bpm  % of predicted HR: 96  Test duration: 1 min and 40 sec  Reason for termination:Completed   ECG Findings  No ischemic ST changes. Arrhythmias  PACS   Symptoms  Lexiscan 0.4 MG IVP given. Patient had no change in  baseline chest discomfort 2/10 during stress or in  recovery.  Patient continued to have severe headache,  nausea and vomiting and muscle cramping during stress  and into recovery. Floor nurse brought Ativan 0.5 mg  po down to stress room. Patient took it and her  symptoms improved dramatically in 15-20 minutes after  taking it. She stopped vomiting, nausea left and  headache improved significantly. O2 sat on room air at  peak injection 53%   Complications  Procedure complication was none. Stress Interpretation  Negative pharmacological stress portion of the study. Procedure Medications   - Lexiscan I.V. 0.4 mg.10 sec  Imaging Protocols   - One Day   Rest                   Stress   Isotope:Myoview        Isotope: Myoview  Isotope dose:12.7 mCi  Isotope dose:33.7 mCi  Date:12/10/2021 00:00  Date:12/10/2021 00:00                          Technique:   Gated  Imaging Results   Applied corrections   - Attenuation correction  applied   - Scatter correction applied     Summed scores     - Summed stress score: 0     - Summed rest score: 0     - Summed difference score:    0     Stress ejection    Ejection fraction:69 %    EDV :81 ml    ESV :25 ml    Stroke volume :56 ml  Medical History   Additional Medical History   Anxiety and Benzo dependency and appears to be having withdrawl  symptoms including nausea, vomiting, muscle spasms and severe  headache. Signatures   ------------------------------------------------------------------  Electronically signed by Kevin Su MD (Interpreting  physician) on 12/10/2021 at 16:44  ------------------------------------------------------------------      PROCEDURES:   Procedures    ASSESSMENT AND PLAN:  [id number] [facility][id number], Jessica Schmidt is a 82 y.o. female history of fibromyalgia, who presents this morning complaining of a cough concerning for Covid. Patient believes that she has been exposed to COVID-19 from her son. Stated that the son was exposed to Covid around 25 December.   She is also concerned because she was recently seen here and diagnosed with pneumonia and spent 3 days in the hospital.  On exam nontoxic in no acute distress satting above 95% on room air. I did obtain an x-ray which was negative. Covid test was obtained and is pending. Patient showed no signs of respiratory distress. No indication for further testing. She was discharged with recommendation to follow-up with primary care doctor. ClINICAL IMPRESSION:  1. Cough    2. Viral URI with cough    3. COVID-19 ruled out          PATIENT REFERRED TO:  Mikal Ingram. [facility] [address]  [address] [facility]  [phone]    Schedule an appointment as soon as possible for a visit in 2 days        DISCHARGE MEDICATIONS:  Discharge Medication List as of 1/3/2022  8:10 AM        DISCONTINUED MEDICATIONS:  Discharge Medication List as of 1/3/2022  8:10 AM        DISPOSITION Decision To Discharge 01/03/2022 07:52:11 AM  -We have instructed the patient, (Alan Grider) to return to the ED or call her PCP if her pain/symptoms worsen. -Findings and recommendations explained to patient. She expressed understanding and agreed with the plan.    ___________________________________________________________________________________  _________________________________________________________________________________________  This record is transcribed utilizing voice recognition technology. There are inherent limitations in this technology. In addition, there may be limitations in editing of this report. If there are any discrepancies, please contact me directly.         Anuel Vieyra MD  01/03/22 1929

## 2022-01-04 ENCOUNTER — CARE COORDINATION (OUTPATIENT)
Dept: CARE COORDINATION | Age: 87
End: 2022-01-04

## 2022-01-04 LAB — SARS-COV-2: DETECTED

## 2022-01-04 NOTE — CARE COORDINATION
Patient contacted regarding COVID-19 exposure and diagnosis. Discussed COVID-19 related testing which was available at this time. Test results were positive. Patient informed of results, if available? Yes. Ambulatory Care Manager contacted the patient by telephone to perform post discharge assessment. Call within 2 business days of discharge: Yes. Verified name and  with patient as identifiers. Provided introduction to self, and explanation of the CTN/ACM role, and reason for call due to risk factors for infection and/or exposure to COVID-19. Symptoms reviewed with patient who verbalized the following symptoms: fever, fatigue, pain or aching joints, cough, confusion or unusual change in mental status, loss of taste or smell, chills or shaking, sweating, nausea, diarrhea, dizziness/lightheadedness, flank pain, no new symptoms, no worsening symptoms and headache, congestion. Instructed pt to stay hydrated, rest, take Tylenol or Ibuprofen for body aches or fever and eat at least small meals. Try OTC decongestant like Mucinex DM. Due to no new or worsening symptoms encounter was not routed to provider for escalation. Discussed follow-up appointments. If no appointment was previously scheduled, appointment scheduling offered: Will follow up Dr. Alma Ch. Pt stated she will call today. Non-face-to-face services provided:  Obtained and reviewed discharge summary and/or continuity of care documents     Advance Care Planning:   Does patient have an Advance Directive:  decision maker updated. Educated patient about risk for severe COVID-19 due to risk factors according to CDC guidelines. ACM reviewed discharge instructions, medical action plan and red flag symptoms with the patient who verbalized understanding. Discussed COVID vaccination status: Yes. Education provided on COVID-19 vaccination as appropriate. Discussed exposure protocols and quarantine with CDC Guidelines.  Patient was given an opportunity to verbalize any questions and concerns and agrees to contact ACM or health care provider for questions related to their healthcare. Reviewed and educated patient on any new and changed medications related to discharge diagnosis     Was patient discharged with a pulse oximeter? No Discussed and confirmed pulse oximeter discharge instructions and when to notify provider or seek emergency care. ACM provided contact information. Plan for follow-up call in 5-7 days based on severity of symptoms and risk factors.

## 2022-01-05 NOTE — ED NOTES
Dc'd to home  To return worsening or changes  Walked out with ease  To check my chart for results  Aware how and why to take meds     Esme Berumen RN  01/04/22 2043

## 2022-01-11 ENCOUNTER — CARE COORDINATION (OUTPATIENT)
Dept: CARE COORDINATION | Age: 87
End: 2022-01-11

## 2022-01-11 NOTE — CARE COORDINATION
Patient contacted regarding COVID-19 diagnosis. Discussed COVID-19 related testing which was available at this time. Test results were positive. Patient informed of results, if available? Yes  Pt stated she is on a steroid tapering dose. Ambulatory Care Manager contacted the patient by telephone to perform follow-up assessment. Verified name and  with patient as identifiers. Patient has following risk factors of: asthma and pneumonia. Symptoms reviewed with patient who verbalized the following symptoms: fatigue, pain or aching joints, cough, confusion or unusual change in mental status, loss of taste or smell, sweating, chest pain, dizziness/lightheadedness, less urine output, flank pain, no new symptoms, no worsening symptoms and headache. Pt stated her B/P is high in the morning before she takes her B/P pill but goes down after taking the medication. B/P 180/11o this AM.     Due to no new or worsening symptoms encounter was not routed to provider for escalation. Instructed pt to stay hydrated, rest, take Tylenol or Ibuprofen for body aches or fever and eat at least small meals. Pt has a follow up appointment with her PCP on 22. Educated patient about risk for severe COVID-19 due to risk factors according to CDC guidelines. ACM reviewed discharge instructions, medical action plan and red flag symptoms with the patient who verbalized understanding. Discussed COVID vaccination status: Yes. Education provided on COVID-19 vaccination as appropriate. Discussed exposure protocols and quarantine with CDC Guidelines. Patient was given an opportunity to verbalize any questions and concerns and agrees to contact ACM or health care provider for questions related to their healthcare. Was patient discharged with a pulse oximeter? No Discussed and confirmed pulse oximeter discharge instructions and when to notify provider or seek emergency care. ACM provided contact information.  Plan for follow-up call in 5-7 days based on severity of symptoms and risk factors.

## 2022-01-18 ENCOUNTER — CARE COORDINATION (OUTPATIENT)
Dept: CARE COORDINATION | Age: 87
End: 2022-01-18

## 2022-01-18 NOTE — CARE COORDINATION
Follow Up Call    Pt stated she saw the PCP 1/17/22 and he told her that she has some residual bronchial COVID pneumonia. The pt stated he ordered a new cough medication. Lungs were clear. Pt stated she still has a dry cough. Nasal congestion is better. The pt stated the PCP told her she was safe to stop quarantining. Challenges to be reviewed by the provider   Additional needs identified to be addressed with provider: No  none                 Encounter was not routed to provider for escalation. Method of communication with provider:  none. Contacted the patient by telephone to follow up after ED. Status: significantly improved  Pt stated she is feeling much better. Interventions to address identified needs: None      Follow up appointment completed? Pt saw her PCP on 1/17/22. Provided contact information for future needs. No further follow-up call indicated based on severity of symptoms and risk factors.   Plan for next call: None    Beau Ulrich RN CCM

## 2023-03-07 ENCOUNTER — HOSPITAL ENCOUNTER (INPATIENT)
Age: 88
LOS: 2 days | Discharge: HOME | DRG: 313 | End: 2023-03-09
Attending: EMERGENCY MEDICINE | Admitting: INTERNAL MEDICINE
Payer: MEDICARE

## 2023-03-07 ENCOUNTER — APPOINTMENT (OUTPATIENT)
Dept: GENERAL RADIOLOGY | Age: 88
DRG: 313 | End: 2023-03-07
Payer: MEDICARE

## 2023-03-07 DIAGNOSIS — R77.8 ELEVATED TROPONIN: ICD-10-CM

## 2023-03-07 DIAGNOSIS — R07.9 ACUTE CHEST PAIN: Primary | ICD-10-CM

## 2023-03-07 LAB
A/G RATIO: 1.4 (ref 1.1–2.2)
ALBUMIN SERPL-MCNC: 4 G/DL (ref 3.4–5)
ALP BLD-CCNC: 80 U/L (ref 40–129)
ALT SERPL-CCNC: 9 U/L (ref 10–40)
ANION GAP SERPL CALCULATED.3IONS-SCNC: 11 MMOL/L (ref 3–16)
AST SERPL-CCNC: 16 U/L (ref 15–37)
BASOPHILS ABSOLUTE: 0 K/UL (ref 0–0.2)
BASOPHILS RELATIVE PERCENT: 0.6 %
BILIRUB SERPL-MCNC: 0.3 MG/DL (ref 0–1)
BUN BLDV-MCNC: 14 MG/DL (ref 7–20)
CALCIUM SERPL-MCNC: 9.5 MG/DL (ref 8.3–10.6)
CHLORIDE BLD-SCNC: 108 MMOL/L (ref 99–110)
CO2: 23 MMOL/L (ref 21–32)
CREAT SERPL-MCNC: 0.9 MG/DL (ref 0.6–1.2)
EKG ATRIAL RATE: 61 BPM
EKG DIAGNOSIS: NORMAL
EKG P-R INTERVAL: 186 MS
EKG Q-T INTERVAL: 408 MS
EKG QRS DURATION: 86 MS
EKG QTC CALCULATION (BAZETT): 410 MS
EKG R AXIS: 2 DEGREES
EKG T AXIS: 6 DEGREES
EKG VENTRICULAR RATE: 61 BPM
EOSINOPHILS ABSOLUTE: 0.2 K/UL (ref 0–0.6)
EOSINOPHILS RELATIVE PERCENT: 3.3 %
GFR SERPL CREATININE-BSD FRML MDRD: >60 ML/MIN/{1.73_M2}
GLUCOSE BLD-MCNC: 94 MG/DL (ref 70–99)
HCT VFR BLD CALC: 37.2 % (ref 36–48)
HEMOGLOBIN: 12.5 G/DL (ref 12–16)
LYMPHOCYTES ABSOLUTE: 1.6 K/UL (ref 1–5.1)
LYMPHOCYTES RELATIVE PERCENT: 28.9 %
MCH RBC QN AUTO: 28.9 PG (ref 26–34)
MCHC RBC AUTO-ENTMCNC: 33.6 G/DL (ref 31–36)
MCV RBC AUTO: 85.9 FL (ref 80–100)
MONOCYTES ABSOLUTE: 0.4 K/UL (ref 0–1.3)
MONOCYTES RELATIVE PERCENT: 7.6 %
NEUTROPHILS ABSOLUTE: 3.4 K/UL (ref 1.7–7.7)
NEUTROPHILS RELATIVE PERCENT: 59.6 %
PDW BLD-RTO: 13.8 % (ref 12.4–15.4)
PLATELET # BLD: 198 K/UL (ref 135–450)
PMV BLD AUTO: 9 FL (ref 5–10.5)
POTASSIUM REFLEX MAGNESIUM: 4.7 MMOL/L (ref 3.5–5.1)
PRO-BNP: 227 PG/ML (ref 0–449)
RBC # BLD: 4.33 M/UL (ref 4–5.2)
SODIUM BLD-SCNC: 142 MMOL/L (ref 136–145)
TOTAL PROTEIN: 6.9 G/DL (ref 6.4–8.2)
TROPONIN: 0.02 NG/ML
WBC # BLD: 5.7 K/UL (ref 4–11)

## 2023-03-07 PROCEDURE — 2060000000 HC ICU INTERMEDIATE R&B

## 2023-03-07 PROCEDURE — 80053 COMPREHEN METABOLIC PANEL: CPT

## 2023-03-07 PROCEDURE — 94760 N-INVAS EAR/PLS OXIMETRY 1: CPT

## 2023-03-07 PROCEDURE — 84484 ASSAY OF TROPONIN QUANT: CPT

## 2023-03-07 PROCEDURE — 93005 ELECTROCARDIOGRAM TRACING: CPT | Performed by: EMERGENCY MEDICINE

## 2023-03-07 PROCEDURE — 2580000003 HC RX 258: Performed by: EMERGENCY MEDICINE

## 2023-03-07 PROCEDURE — 6370000000 HC RX 637 (ALT 250 FOR IP): Performed by: INTERNAL MEDICINE

## 2023-03-07 PROCEDURE — 36415 COLL VENOUS BLD VENIPUNCTURE: CPT

## 2023-03-07 PROCEDURE — 6360000002 HC RX W HCPCS: Performed by: INTERNAL MEDICINE

## 2023-03-07 PROCEDURE — 85025 COMPLETE CBC W/AUTO DIFF WBC: CPT

## 2023-03-07 PROCEDURE — 71045 X-RAY EXAM CHEST 1 VIEW: CPT

## 2023-03-07 PROCEDURE — 6370000000 HC RX 637 (ALT 250 FOR IP): Performed by: EMERGENCY MEDICINE

## 2023-03-07 PROCEDURE — 99285 EMERGENCY DEPT VISIT HI MDM: CPT

## 2023-03-07 PROCEDURE — 83880 ASSAY OF NATRIURETIC PEPTIDE: CPT

## 2023-03-07 PROCEDURE — 2580000003 HC RX 258: Performed by: INTERNAL MEDICINE

## 2023-03-07 PROCEDURE — 93010 ELECTROCARDIOGRAM REPORT: CPT | Performed by: INTERNAL MEDICINE

## 2023-03-07 RX ORDER — 0.9 % SODIUM CHLORIDE 0.9 %
500 INTRAVENOUS SOLUTION INTRAVENOUS ONCE
Status: COMPLETED | OUTPATIENT
Start: 2023-03-07 | End: 2023-03-07

## 2023-03-07 RX ORDER — ZOLPIDEM TARTRATE 5 MG/1
5 TABLET ORAL NIGHTLY PRN
COMMUNITY

## 2023-03-07 RX ORDER — ATORVASTATIN CALCIUM 80 MG/1
80 TABLET, FILM COATED ORAL NIGHTLY
Status: DISCONTINUED | OUTPATIENT
Start: 2023-03-07 | End: 2023-03-08

## 2023-03-07 RX ORDER — ZOLPIDEM TARTRATE 5 MG/1
5 TABLET ORAL NIGHTLY PRN
Status: DISCONTINUED | OUTPATIENT
Start: 2023-03-07 | End: 2023-03-09 | Stop reason: HOSPADM

## 2023-03-07 RX ORDER — PANTOPRAZOLE SODIUM 40 MG/1
40 TABLET, DELAYED RELEASE ORAL
Status: DISCONTINUED | OUTPATIENT
Start: 2023-03-08 | End: 2023-03-09 | Stop reason: HOSPADM

## 2023-03-07 RX ORDER — DOCUSATE SODIUM 100 MG/1
100 CAPSULE, LIQUID FILLED ORAL EVERY MORNING
Status: DISCONTINUED | OUTPATIENT
Start: 2023-03-08 | End: 2023-03-09 | Stop reason: HOSPADM

## 2023-03-07 RX ORDER — ASPIRIN 81 MG/1
81 TABLET, CHEWABLE ORAL DAILY
Status: DISCONTINUED | OUTPATIENT
Start: 2023-03-08 | End: 2023-03-09 | Stop reason: HOSPADM

## 2023-03-07 RX ORDER — DOCUSATE SODIUM 100 MG/1
100 CAPSULE, LIQUID FILLED ORAL EVERY MORNING
COMMUNITY

## 2023-03-07 RX ORDER — LISINOPRIL 10 MG/1
10 TABLET ORAL EVERY MORNING
COMMUNITY

## 2023-03-07 RX ORDER — ACETAMINOPHEN 325 MG/1
650 TABLET ORAL EVERY 6 HOURS PRN
Status: DISCONTINUED | OUTPATIENT
Start: 2023-03-07 | End: 2023-03-09 | Stop reason: HOSPADM

## 2023-03-07 RX ORDER — LISINOPRIL 20 MG/1
20 TABLET ORAL NIGHTLY
Status: DISCONTINUED | OUTPATIENT
Start: 2023-03-07 | End: 2023-03-09 | Stop reason: HOSPADM

## 2023-03-07 RX ORDER — ALBUTEROL SULFATE 90 UG/1
2 AEROSOL, METERED RESPIRATORY (INHALATION) EVERY 6 HOURS PRN
Status: DISCONTINUED | OUTPATIENT
Start: 2023-03-07 | End: 2023-03-09 | Stop reason: HOSPADM

## 2023-03-07 RX ORDER — LORAZEPAM 0.5 MG/1
0.5 TABLET ORAL EVERY 6 HOURS PRN
Status: DISCONTINUED | OUTPATIENT
Start: 2023-03-07 | End: 2023-03-09 | Stop reason: HOSPADM

## 2023-03-07 RX ORDER — POLYETHYLENE GLYCOL 3350 17 G/17G
17 POWDER, FOR SOLUTION ORAL DAILY PRN
Status: DISCONTINUED | OUTPATIENT
Start: 2023-03-07 | End: 2023-03-09 | Stop reason: HOSPADM

## 2023-03-07 RX ORDER — SODIUM CHLORIDE 0.9 % (FLUSH) 0.9 %
5-40 SYRINGE (ML) INJECTION PRN
Status: DISCONTINUED | OUTPATIENT
Start: 2023-03-07 | End: 2023-03-09 | Stop reason: HOSPADM

## 2023-03-07 RX ORDER — SODIUM CHLORIDE 0.9 % (FLUSH) 0.9 %
5-40 SYRINGE (ML) INJECTION EVERY 12 HOURS SCHEDULED
Status: DISCONTINUED | OUTPATIENT
Start: 2023-03-07 | End: 2023-03-09 | Stop reason: HOSPADM

## 2023-03-07 RX ORDER — ENOXAPARIN SODIUM 100 MG/ML
40 INJECTION SUBCUTANEOUS DAILY
Status: DISCONTINUED | OUTPATIENT
Start: 2023-03-07 | End: 2023-03-09 | Stop reason: HOSPADM

## 2023-03-07 RX ORDER — ONDANSETRON 2 MG/ML
4 INJECTION INTRAMUSCULAR; INTRAVENOUS EVERY 6 HOURS PRN
Status: DISCONTINUED | OUTPATIENT
Start: 2023-03-07 | End: 2023-03-09 | Stop reason: HOSPADM

## 2023-03-07 RX ORDER — LISINOPRIL 20 MG/1
20 TABLET ORAL NIGHTLY
Status: ON HOLD | COMMUNITY
End: 2023-03-09 | Stop reason: HOSPADM

## 2023-03-07 RX ORDER — ONDANSETRON 4 MG/1
4 TABLET, ORALLY DISINTEGRATING ORAL EVERY 8 HOURS PRN
Status: DISCONTINUED | OUTPATIENT
Start: 2023-03-07 | End: 2023-03-09 | Stop reason: HOSPADM

## 2023-03-07 RX ORDER — OMEPRAZOLE 20 MG/1
20 CAPSULE, DELAYED RELEASE ORAL EVERY MORNING
Status: DISCONTINUED | OUTPATIENT
Start: 2023-03-08 | End: 2023-03-07

## 2023-03-07 RX ORDER — LORAZEPAM 0.5 MG/1
0.5 TABLET ORAL EVERY 6 HOURS PRN
COMMUNITY

## 2023-03-07 RX ORDER — LORAZEPAM 0.5 MG/1
0.5 TABLET ORAL ONCE
Status: COMPLETED | OUTPATIENT
Start: 2023-03-07 | End: 2023-03-07

## 2023-03-07 RX ORDER — ACETAMINOPHEN 650 MG/1
650 SUPPOSITORY RECTAL EVERY 6 HOURS PRN
Status: DISCONTINUED | OUTPATIENT
Start: 2023-03-07 | End: 2023-03-09 | Stop reason: HOSPADM

## 2023-03-07 RX ORDER — SODIUM CHLORIDE 9 MG/ML
INJECTION, SOLUTION INTRAVENOUS PRN
Status: DISCONTINUED | OUTPATIENT
Start: 2023-03-07 | End: 2023-03-09 | Stop reason: HOSPADM

## 2023-03-07 RX ADMIN — ENOXAPARIN SODIUM 40 MG: 100 INJECTION SUBCUTANEOUS at 17:39

## 2023-03-07 RX ADMIN — LORAZEPAM 0.5 MG: 0.5 TABLET ORAL at 14:56

## 2023-03-07 RX ADMIN — LISINOPRIL 20 MG: 20 TABLET ORAL at 21:53

## 2023-03-07 RX ADMIN — SODIUM CHLORIDE, PRESERVATIVE FREE 10 ML: 5 INJECTION INTRAVENOUS at 20:01

## 2023-03-07 RX ADMIN — ATORVASTATIN CALCIUM 80 MG: 80 TABLET, FILM COATED ORAL at 20:00

## 2023-03-07 RX ADMIN — ZOLPIDEM TARTRATE 5 MG: 5 TABLET ORAL at 21:53

## 2023-03-07 RX ADMIN — SODIUM CHLORIDE 500 ML: 9 INJECTION, SOLUTION INTRAVENOUS at 15:06

## 2023-03-07 RX ADMIN — NITROGLYCERIN 0.5 INCH: 20 OINTMENT TOPICAL at 14:57

## 2023-03-07 RX ADMIN — ACETAMINOPHEN 650 MG: 325 TABLET ORAL at 17:39

## 2023-03-07 ASSESSMENT — PAIN - FUNCTIONAL ASSESSMENT: PAIN_FUNCTIONAL_ASSESSMENT: 0-10

## 2023-03-07 ASSESSMENT — ENCOUNTER SYMPTOMS
ABDOMINAL PAIN: 0
NAUSEA: 0
EYE DISCHARGE: 0
VOMITING: 0
DIARRHEA: 0
SORE THROAT: 0
RHINORRHEA: 0
BACK PAIN: 0
WHEEZING: 0
EYE PAIN: 0
SHORTNESS OF BREATH: 1
COUGH: 0

## 2023-03-07 ASSESSMENT — PAIN DESCRIPTION - LOCATION
LOCATION: HEAD
LOCATION: CHEST
LOCATION: HEAD;NECK

## 2023-03-07 ASSESSMENT — PAIN SCALES - GENERAL
PAINLEVEL_OUTOF10: 6
PAINLEVEL_OUTOF10: 7
PAINLEVEL_OUTOF10: 8
PAINLEVEL_OUTOF10: 0

## 2023-03-07 ASSESSMENT — PAIN DESCRIPTION - DESCRIPTORS
DESCRIPTORS: ACHING;DISCOMFORT
DESCRIPTORS: CRAMPING

## 2023-03-07 ASSESSMENT — PAIN DESCRIPTION - ORIENTATION
ORIENTATION: LEFT
ORIENTATION: MID

## 2023-03-07 NOTE — CARE COORDINATION
Case Management Assessment  Initial Evaluation    Date/Time of Evaluation: 3/7/2023 4:24 PM  Assessment Completed by: MADDIE Shah    If patient is discharged prior to next notation, then this note serves as note for discharge by case management. Patient Name: Braulio Antonio                   YOB: 1935  Diagnosis: Chest pain [R07.9]                   Date / Time: 3/7/2023 11:15 AM    Patient Admission Status: Inpatient   Readmission Risk (Low < 19, Mod (19-27), High > 27): Readmission Risk Score: 7.7    Current PCP: Masha Polanco MD  PCP verified by CM? Yes    Chart Reviewed: Yes      History Provided by: Patient  Patient Orientation: Alert and Oriented    Patient Cognition: Alert    Hospitalization in the last 30 days (Readmission):  Yes    If yes, Readmission Assessment in  Navigator will be completed. Advance Directives:      Code Status: Prior   Patient's Primary Decision Maker is: Legal Next of Kin    Primary Decision Maker: Ren Gibbsin - Child    Discharge Planning:    Patient lives with: Alone Type of Home: Other (Comment) (condo)  Primary Care Giver: Self  Patient Support Systems include: Friends/Neighbors, Children   Current Financial resources:    Current community resources:    Current services prior to admission:              Current DME:              Type of Home Care services:  None    ADLS  Prior functional level: Independent in ADLs/IADLs  Current functional level: Independent in ADLs/IADLs    PT AM-PAC:   /24  OT AM-PAC:   /24    Family can provide assistance at DC: No  Would you like Case Management to discuss the discharge plan with any other family members/significant others, and if so, who? Plans to Return to Present Housing: Yes  Other Identified Issues/Barriers to RETURNING to current housing: patient is in the process of selling her Condo, planning on moving to [place] to be closer to her daughter who has Cancer.   Potential Assistance needed at discharge:              Potential DME:    Patient expects to discharge to: Other (comment) (condo)  Plan for transportation at discharge: Friends    Financial    Payor: MEDICARE / Plan: RAILROAD MEDICARE / Product Type: *No Product type* /     Does insurance require precert for SNF: No    Potential assistance Purchasing Medications:    Meds-to-Beds request:        [address] [facility], [address] [place] [address] [address] [address]  Phone: [phone] Fax: [phone]      Notes:    Factors facilitating achievement of predicted outcomes: Friend support, Motivated, Cooperative, and Pleasant    Barriers to discharge: Limited family support, No caregiver support, and Anxiety    Additional Case Management Notes: Patient's car is at Dr. Stefano Newman office. , provided home care and SNF list for choice. The Plan for Transition of Care is related to the following treatment goals of Chest pain [P40.9]    IF APPLICABLE: The Patient and/or patient representative Zackery Browne and her family were provided with a choice of provider and agrees with the discharge plan. Freedom of choice list with basic dialogue that supports the patient's individualized plan of care/goals and shares the quality data associated with the providers was provided to: Patient   Patient Representative Name:       The Patient and/or Patient Representative Agree with the Discharge Plan?  Yes    Shaniqua Meneses, [place]  Case Management Department  Ph: [phone] Fax: [phone]

## 2023-03-07 NOTE — ED PROVIDER NOTES
[facility]        Pt Name: Mary Canada  MRN: [id number]  [place] 1935  Date of evaluation: 3/7/2023  Provider: Kb Echavarria MD  PCP: Bruno Guzman MD  Note Started: 12:26 PM EST 3/7/23    CHIEF COMPLAINT       Chief Complaint   Patient presents with    Other     Slight chest pain with cramping in center/left. Started with neck pain/cramping both sides a week ago. Was at Dr. Armaan Ramos and sent in today       HISTORY OF PRESENT ILLNESS: 1 or more Elements             Mary Canada is a 82 y.o. female  has a past medical history of Anesthesia complication, Arthritis, Cancer ([address] GERD (gastroesophageal reflux disease), Nausea & vomiting, Psoriasis, and Unspecified cerebral artery occlusion with cerebral infarction. who presents with pain and shortness of breath. Patient's been having some intermittent cramping on the left side of her neck since May of last year when she had COVID. Seems exertional.  However it always goes away. Saturday she was trying to lift up a portion of her mattress. Since then she has been having increasing pain in both sides of her neck. It has now migrated down into her chest.  She is also having left arm discomfort. Anytime she tries to do anything ambulate or exert herself she gets the chest pain and shortness of breath. She was diaphoretic on Sunday. She first went to her primary care provider's office who then called 911 and sent her to the emergency department. She did have a negative nuclear stress test in December 2021. Nursing Notes were all reviewed and agreed with or any disagreements were addressed in the HPI. REVIEW OF SYSTEMS :      Review of Systems   Constitutional:  Negative for chills, fatigue and fever. HENT:  Negative for ear pain, rhinorrhea and sore throat. Eyes:  Negative for pain, discharge and visual disturbance.    Respiratory:  Positive for shortness of breath. Negative for cough and wheezing. Cardiovascular:  Positive for chest pain. Negative for palpitations and leg swelling. Gastrointestinal:  Negative for abdominal pain, diarrhea, nausea and vomiting. Genitourinary:  Negative for difficulty urinating, dysuria, pelvic pain and vaginal discharge. Musculoskeletal:  Positive for arthralgias and neck pain. Negative for back pain and joint swelling. Skin:  Negative for rash. Allergic/Immunologic: Negative for environmental allergies. Neurological:  Negative for dizziness, seizures, syncope and headaches. Hematological:  Negative for adenopathy. Psychiatric/Behavioral:  Negative for dysphoric mood and suicidal ideas. The patient is not nervous/anxious. Positives and Pertinent negatives as per HPI. PAST MEDICAL HISTORY      has a past medical history of Anesthesia complication, Arthritis, Cancer ([address] GERD (gastroesophageal reflux disease), Nausea & vomiting, Psoriasis, and Unspecified cerebral artery occlusion with cerebral infarction (5 years ago). SURGICAL HISTORY     Past Surgical History:   Procedure Laterality Date    BUNIONECTOMY Right     EXAM UNDER GENERAL ANESTHESIA OF NASOPHARYNX      HAND SURGERY Right     related to arthritis    NOSE SURGERY  4 years ago    1) nasal fracture and 2nd) fungal infection removed    SKIN CANCER EXCISION Left 2014       CURRENTMEDICATIONS       Previous Medications    ALBUTEROL SULFATE  (90 BASE) MCG/ACT INHALER    Inhale 2 puffs into the lungs every 6 hours as needed for Shortness of Breath     DOCUSATE SODIUM (COLACE) 100 MG CAPSULE    Take 100 mg by mouth every morning    LISINOPRIL (PRINIVIL;ZESTRIL) 10 MG TABLET    Take 10 mg by mouth every morning    LISINOPRIL (PRINIVIL;ZESTRIL) 20 MG TABLET    Take 20 mg by mouth at bedtime    LORAZEPAM (ATIVAN) 0.5 MG TABLET    Take 0.5 mg by mouth every 6 hours as needed for Anxiety.     MOMETASONE-FORMOTEROL (DULERA) 200-5 MCG/ACT INHALER    Inhale 2 puffs into the lungs every 12 hours    MULTIPLE VITAMINS-MINERALS (THERAPEUTIC MULTIVITAMIN-MINERALS) TABLET    Take 1 tablet by mouth daily. OMEPRAZOLE (PRILOSEC) 40 MG DELAYED RELEASE CAPSULE    take one capsule by mouth daily    VITAMIN D (ERGOCALCIFEROL) 24559 UNITS CAPS CAPSULE    Take 1 capsule by mouth once a week Friday    ZOLPIDEM (AMBIEN) 5 MG TABLET    Take 5 mg by mouth nightly as needed for Sleep. ALLERGIES     Sulfa antibiotics, Azithromycin, Hydrocodone-acetaminophen, Morphine, and Nitrofurantoin    FAMILYHISTORY       Family History   Problem Relation Age of Onset    Asthma Mother     Heart Disease Mother     Cancer Mother     Heart Disease Father     Early Death Brother     Cancer Brother         esophogeal cancer    Asthma Brother     Heart Disease Brother         SOCIAL HISTORY       Social History     Tobacco Use    Smoking status: Former    Smokeless tobacco: Never    Tobacco comments:     smokes as a teenager quit when she was 23years old   [de-identified] Use    Vaping Use: Never used   Substance Use Topics    Alcohol use: No    Drug use: No       SCREENINGS        Jordi Coma Scale  Eye Opening: Spontaneous  Best Verbal Response: Oriented  Best Motor Response: Obeys commands  Benson Coma Scale Score: 15                CIWA Assessment  BP: (!) 162/111  Heart Rate: 69           PHYSICAL EXAM  1 or more Elements     ED Triage Vitals   BP Temp Temp Source Heart Rate Resp SpO2 Height Weight   03/07/23 1130 03/07/23 1130 03/07/23 1130 03/07/23 1119 03/07/23 1130 03/07/23 1119 03/07/23 1132 03/07/23 1132   (!) 166/95 97.7 °F (36.5 °C) Oral 62 13 100 % 4' 11\" (1.499 m) 159 lb 9.8 oz (72.4 kg)       Physical Exam  Constitutional:       Appearance: She is well-developed. She is not diaphoretic. HENT:      Head: Normocephalic and atraumatic. Right Ear: External ear normal.      Left Ear: External ear normal.   Eyes:      General: No scleral icterus.         Right eye: No discharge. Left eye: No discharge. Neck:      Thyroid: No thyromegaly. Vascular: No JVD. Trachea: No tracheal deviation. Cardiovascular:      Rate and Rhythm: Regular rhythm. Bradycardia present. Heart sounds: No murmur heard. No friction rub. No gallop. Pulmonary:      Effort: Pulmonary effort is normal. No respiratory distress. Breath sounds: Normal breath sounds. No stridor. No wheezing or rales. Abdominal:      General: There is no distension. Palpations: Abdomen is soft. Tenderness: There is no abdominal tenderness. There is no guarding or rebound. Musculoskeletal:         General: No tenderness. Cervical back: Normal range of motion. Skin:     General: Skin is warm and dry. Findings: No rash (On exposed body surfaces). Neurological:      Mental Status: She is alert and oriented to person, place, and time. Coordination: Coordination normal.   Psychiatric:         Behavior: Behavior normal.         Thought Content:  Thought content normal.           DIAGNOSTIC RESULTS   LABS:    Results for orders placed or performed during the hospital encounter of 03/07/23   CBC with Auto Differential   Result Value Ref Range    WBC 5.7 4.0 - 11.0 K/uL    RBC 4.33 4.00 - 5.20 M/uL    Hemoglobin 12.5 12.0 - 16.0 g/dL    Hematocrit 37.2 36.0 - 48.0 %    MCV 85.9 80.0 - 100.0 fL    MCH 28.9 26.0 - 34.0 pg    MCHC 33.6 31.0 - 36.0 g/dL    RDW 13.8 12.4 - 15.4 %    Platelets 114 499 - 864 K/uL    MPV 9.0 5.0 - 10.5 fL    Neutrophils % 59.6 %    Lymphocytes % 28.9 %    Monocytes % 7.6 %    Eosinophils % 3.3 %    Basophils % 0.6 %    Neutrophils Absolute 3.4 1.7 - 7.7 K/uL    Lymphocytes Absolute 1.6 1.0 - 5.1 K/uL    Monocytes Absolute 0.4 0.0 - 1.3 K/uL    Eosinophils Absolute 0.2 0.0 - 0.6 K/uL    Basophils Absolute 0.0 0.0 - 0.2 K/uL   CMP w/ Reflex to MG   Result Value Ref Range    Sodium 142 136 - 145 mmol/L    Potassium reflex Magnesium 4.7 3.5 - 5.1 mmol/L Chloride 108 99 - 110 mmol/L    CO2 23 21 - 32 mmol/L    Anion Gap 11 3 - 16    Glucose 94 70 - 99 mg/dL    BUN 14 7 - 20 mg/dL    Creatinine 0.9 0.6 - 1.2 mg/dL    Est, Glom Filt Rate >60 >60    Calcium 9.5 8.3 - 10.6 mg/dL    Total Protein 6.9 6.4 - 8.2 g/dL    Albumin 4.0 3.4 - 5.0 g/dL    Albumin/Globulin Ratio 1.4 1.1 - 2.2    Total Bilirubin 0.3 0.0 - 1.0 mg/dL    Alkaline Phosphatase 80 40 - 129 U/L    ALT 9 (L) 10 - 40 U/L    AST 16 15 - 37 U/L   Troponin   Result Value Ref Range    Troponin 0.02 (H) <0.01 ng/mL   BNP   Result Value Ref Range    Pro- 0 - 449 pg/mL   EKG 12 Lead   Result Value Ref Range    Ventricular Rate 61 BPM    Atrial Rate 61 BPM    P-R Interval 186 ms    QRS Duration 86 ms    Q-T Interval 408 ms    QTc Calculation (Bazett) 410 ms    R Axis 2 degrees    T Axis 6 degrees    Diagnosis       Unusual P axis, possible ectopic atrial rhythmAbnormal ECGConfirmed by JEN BRAUN, Yasir Kennedy ([id number]) on 3/7/2023 1:00:41 PM       When ordered only abnormal lab results are displayed. All other labs were within normal range or not returned as of this dictation. EKG: EKG visualized preliminarily interpreted by myself. Rhythm is sinus at a rate of 61. The axis is 2. ST-T waves intervals are all within normal limits. RADIOLOGY:   Non-plain film images such as CT, Ultrasound and MRI are read by the radiologist. Plain radiographic images are visualized and preliminarily interpreted by the ED Provider with the below findings:    XR CHEST PORTABLE    Result Date: 3/7/2023  EXAMINATION: ONE XRAY VIEW OF THE CHEST 3/7/2023 12:13 pm COMPARISON: None. HISTORY: ORDERING SYSTEM PROVIDED HISTORY: Chest Pain SOB TECHNOLOGIST PROVIDED HISTORY: Reason for exam:->Chest Pain SOB FINDINGS: Normal cardiomediastinal silhouette. Mild bibasilar atelectasis. No consolidation. No pneumothorax or pleural effusion     Mild bibasilar atelectasis.         Interpretation per the Radiologist below, if available at the time of this note:    XR CHEST PORTABLE   Final Result   Mild bibasilar atelectasis. No results found. PROCEDURES   Unless otherwise noted below, none     Procedures    CRITICAL CARE TIME   CRITICAL CARE: There was a high probability of clinically significant/life threatening deterioration in this patient's condition which required my urgent intervention. Total critical care time was 30 minutes. This excludes any time for separately reportable procedures. EMERGENCY DEPARTMENT COURSE and DIFFERENTIAL DIAGNOSIS/MDM:   Vitals:    Vitals:    03/07/23 1200 03/07/23 1215 03/07/23 1230 03/07/23 1457   BP: (!) 167/91 (!) 141/81 (!) 149/86 (!) 162/111   Pulse: 55 56 53 69   Resp: 14 18 13    Temp:       TempSrc:       SpO2: 98% 96% 96%    Weight:       Height:           Patient was given the following medications:  Medications   0.9 % sodium chloride bolus (has no administration in time range)   LORazepam (ATIVAN) tablet 0.5 mg (0.5 mg Oral Given 3/7/23 1456)   nitroglycerin (NITRO-BID) 2 % ointment 0.5 inch (0.5 inches Topical Given 3/7/23 1457)             Is this patient to be included in the SEP-1 Core Measure due to severe sepsis or septic shock? No   Exclusion criteria - the patient is NOT to be included for SEP-1 Core Measure due to: Infection is not suspected    CONSULTS: (Who and What was discussed)  None                CC/HPI Summary, DDx, ED Course, and Reassessment: Initially very consistent with atypical pain and that it was really just in her neck and has been going on for about 10 months. However over the weekend it does sound as if she has been having possible angina. EKG did not show any acute findings but her troponin did come back elevated. She had a negative stress test in December 2021 but feel obliged to request inpatient care and further evaluation.     Disposition Considerations (tests considered but not done, Shared Decision Making, Pt Expectation of Test or Tx.): As above        I am the Primary Clinician of Record. FINAL IMPRESSION      1. Acute chest pain    2. Elevated troponin          DISPOSITION/PLAN     DISPOSITION Decision To Admit 03/07/2023 02:28:58 PM      PATIENT REFERRED TO:  No follow-up provider specified. DISCHARGE MEDICATIONS:  New Prescriptions    No medications on file       DISCONTINUED MEDICATIONS:  Discontinued Medications    BISACODYL (DULCOLAX) 5 MG EC TABLET    Take 5 mg by mouth every morning    DOCUSATE SODIUM (COLACE) 100 MG CAPSULE    Take 1 capsule by mouth 2 times daily    LISINOPRIL (PRINIVIL;ZESTRIL) 10 MG TABLET    Take 20 mg by mouth 2 times daily     ZOLPIDEM (AMBIEN) 10 MG TABLET    Take 2.5 mg by mouth nightly as needed for Sleep.                (Please note that portions of this note were completed with a voice recognition program.  Efforts were made to edit the dictations but occasionally words are mis-transcribed.)    Sun Arcos MD (electronically signed)           Sun Arcos MD  03/07/23 2854

## 2023-03-07 NOTE — ACP (ADVANCE CARE PLANNING)
Advance Care Planning     Advance Care Planning Activator (Inpatient)  Conversation Note      Date of ACP Conversation: 3/7/2023     Conversation Conducted with: Patient with Decision Making Capacity    ACP Activator: Reji Brennan, [address] [facility] Decision Maker:     Current Designated Health Care Decision Maker:     Primary Decision Maker: Ted Jarrell - Child    Today we documented Decision Maker(s) consistent with Legal Next of Kin hierarchy. Care Preferences    Ventilation: \"If you were in your present state of health and suddenly became very ill and were unable to breathe on your own, what would your preference be about the use of a ventilator (breathing machine) if it were available to you? \"      Would the patient desire the use of ventilator (breathing machine)?: yes    \"If your health worsens and it becomes clear that your chance of recovery is unlikely, what would your preference be about the use of a ventilator (breathing machine) if it were available to you? \"     Would the patient desire the use of ventilator (breathing machine)?: Yes      Resuscitation  \"CPR works best to restart the heart when there is a sudden event, like a heart attack, in someone who is otherwise healthy. Unfortunately, CPR does not typically restart the heart for people who have serious health conditions or who are very sick. \"    \"In the event your heart stopped as a result of an underlying serious health condition, would you want attempts to be made to restart your heart (answer \"yes\" for attempt to resuscitate) or would you prefer a natural death (answer \"no\" for do not attempt to resuscitate)? \" yes       [] Yes   [] No   Educated Patient / Estel Jenny regarding differences between Advance Directives and portable DNR orders.     Length of ACP Conversation in minutes:      Conversation Outcomes:  ACP discussion completed    Follow-up plan:    [] Schedule follow-up conversation to continue planning  [] Referred individual to Provider for additional questions/concerns   [] Advised patient/agent/surrogate to review completed ACP document and update if needed with changes in condition, patient preferences or care setting    [x] This note routed to one or more involved healthcare providers

## 2023-03-07 NOTE — PROGRESS NOTES
Pt arrived to room 4279 from ED without complications. Oriented pt to room, bed in lowest position with wheels locked and alarm on. Helped pt order her clear liquid tray. Pt resting comfortably. Will continue to monitor.

## 2023-03-07 NOTE — PROGRESS NOTES
Medication Reconciliation    List of medications patient is currently taking is complete. Source of information: 1. Conversation with patient at bedside                                      2. EPIC records      Allergies  Sulfa antibiotics, Azithromycin, Hydrocodone-acetaminophen, Morphine, and Nitrofurantoin     Notes regarding home medications:   1. Patient received all of her morning home medications prior to arrival to the emergency department today. 2. Patient takes her Ergocalciferol 50,000 IU po Q7Days on Fridays.     Nirali Young [facility], PharmD, BCPS  3/7/2023 4:19 PM

## 2023-03-07 NOTE — PROGRESS NOTES
4 Eyes Skin Assessment     NAME:  Huy Cedeno  YOB: 1935  MEDICAL RECORD NUMBER:  [id number]    The patient is being assessed for  Admission    I agree that One RN has performed a thorough Head to Toe Skin Assessment on the patient. ALL assessment sites listed below have been assessed. Areas assessed by both nurses:    Head, Face, Ears, Shoulders, Back, Chest, Arms, Elbows, Hands, Sacrum. Buttock, Coccyx, Ischium, and Legs. Feet and Heels        Does the Patient have a Wound?  No noted wound(s)       Rocky Prevention initiated by RN: No   Wound Care Orders initiated by RN: No    Pressure Injury (Stage 3,4, Unstageable, DTI, NWPT, and Complex wounds) if present, place referral order by RN under : No    New and Established Ostomies, if present place, referral order under : No      Nurse 1 eSignature: Electronically signed by Joy Jackson RN on 3/7/23 at 5:14 PM EST    **SHARE this note so that the co-signing nurse can place an eSignature**    Nurse 2 eSignature: Electronically signed by Asaf Ho RN on 3/7/23 at 5:15 PM EST

## 2023-03-07 NOTE — ED NOTES
Patient remains alert and oreinted. Pain score and patient condition reviewed. No acute distress noted. Report given to RN recieving patient.           Julia Yun RN  03/07/23 [id number]

## 2023-03-07 NOTE — ED NOTES
Pt arrived to dept via ems. Pt c/o neck cramping and pain on going for a week that has radiated to left center of chest. Denies any SOB. Ambulated to bathroom and back with cane. No acute distress noted. Pt awake, alert and oriented x 3. Skin warm and dry/normal color for ethnicity. Resp easy and unlabored. Pt placed in gown and on cardiac monitor. Call light in reach. Will continue to monitor.        Sanjay Luis, TROY  03/07/23 7823

## 2023-03-08 ENCOUNTER — APPOINTMENT (OUTPATIENT)
Dept: CT IMAGING | Age: 88
DRG: 313 | End: 2023-03-08
Payer: MEDICARE

## 2023-03-08 LAB
D DIMER: 0.38 UG/ML FEU (ref 0–0.6)
EKG ATRIAL RATE: 55 BPM
EKG DIAGNOSIS: NORMAL
EKG P AXIS: 59 DEGREES
EKG P-R INTERVAL: 146 MS
EKG Q-T INTERVAL: 452 MS
EKG QRS DURATION: 96 MS
EKG QTC CALCULATION (BAZETT): 432 MS
EKG R AXIS: 6 DEGREES
EKG T AXIS: 11 DEGREES
EKG VENTRICULAR RATE: 55 BPM
HCT VFR BLD CALC: 37.3 % (ref 36–48)
HEMOGLOBIN: 12.5 G/DL (ref 12–16)
LV EF: 60 %
LVEF MODALITY: NORMAL
MCH RBC QN AUTO: 28.8 PG (ref 26–34)
MCHC RBC AUTO-ENTMCNC: 33.5 G/DL (ref 31–36)
MCV RBC AUTO: 85.9 FL (ref 80–100)
PDW BLD-RTO: 13.6 % (ref 12.4–15.4)
PLATELET # BLD: 162 K/UL (ref 135–450)
PMV BLD AUTO: 8.8 FL (ref 5–10.5)
RBC # BLD: 4.34 M/UL (ref 4–5.2)
TROPONIN: <0.01 NG/ML
TROPONIN: <0.01 NG/ML
WBC # BLD: 4.4 K/UL (ref 4–11)

## 2023-03-08 PROCEDURE — 99223 1ST HOSP IP/OBS HIGH 75: CPT | Performed by: INTERNAL MEDICINE

## 2023-03-08 PROCEDURE — 6370000000 HC RX 637 (ALT 250 FOR IP): Performed by: STUDENT IN AN ORGANIZED HEALTH CARE EDUCATION/TRAINING PROGRAM

## 2023-03-08 PROCEDURE — 84484 ASSAY OF TROPONIN QUANT: CPT

## 2023-03-08 PROCEDURE — 36415 COLL VENOUS BLD VENIPUNCTURE: CPT

## 2023-03-08 PROCEDURE — 2580000003 HC RX 258: Performed by: INTERNAL MEDICINE

## 2023-03-08 PROCEDURE — 6360000002 HC RX W HCPCS: Performed by: INTERNAL MEDICINE

## 2023-03-08 PROCEDURE — 6370000000 HC RX 637 (ALT 250 FOR IP): Performed by: INTERNAL MEDICINE

## 2023-03-08 PROCEDURE — 6360000002 HC RX W HCPCS: Performed by: STUDENT IN AN ORGANIZED HEALTH CARE EDUCATION/TRAINING PROGRAM

## 2023-03-08 PROCEDURE — 72126 CT NECK SPINE W/DYE: CPT

## 2023-03-08 PROCEDURE — 85027 COMPLETE CBC AUTOMATED: CPT

## 2023-03-08 PROCEDURE — 94760 N-INVAS EAR/PLS OXIMETRY 1: CPT

## 2023-03-08 PROCEDURE — 93010 ELECTROCARDIOGRAM REPORT: CPT | Performed by: INTERNAL MEDICINE

## 2023-03-08 PROCEDURE — 93005 ELECTROCARDIOGRAM TRACING: CPT | Performed by: INTERNAL MEDICINE

## 2023-03-08 PROCEDURE — 6360000004 HC RX CONTRAST MEDICATION: Performed by: INTERNAL MEDICINE

## 2023-03-08 PROCEDURE — 85379 FIBRIN DEGRADATION QUANT: CPT

## 2023-03-08 PROCEDURE — 93306 TTE W/DOPPLER COMPLETE: CPT

## 2023-03-08 PROCEDURE — 2060000000 HC ICU INTERMEDIATE R&B

## 2023-03-08 RX ORDER — CYCLOBENZAPRINE HCL 10 MG
5 TABLET ORAL NIGHTLY
Status: COMPLETED | OUTPATIENT
Start: 2023-03-08 | End: 2023-03-08

## 2023-03-08 RX ORDER — MORPHINE SULFATE 2 MG/ML
2 INJECTION, SOLUTION INTRAMUSCULAR; INTRAVENOUS ONCE
Status: COMPLETED | OUTPATIENT
Start: 2023-03-08 | End: 2023-03-08

## 2023-03-08 RX ORDER — MORPHINE SULFATE 2 MG/ML
2 INJECTION, SOLUTION INTRAMUSCULAR; INTRAVENOUS EVERY 4 HOURS PRN
Status: DISCONTINUED | OUTPATIENT
Start: 2023-03-08 | End: 2023-03-09 | Stop reason: HOSPADM

## 2023-03-08 RX ORDER — ATORVASTATIN CALCIUM 20 MG/1
20 TABLET, FILM COATED ORAL NIGHTLY
Status: DISCONTINUED | OUTPATIENT
Start: 2023-03-08 | End: 2023-03-09 | Stop reason: HOSPADM

## 2023-03-08 RX ORDER — ACETAMINOPHEN 500 MG
1000 TABLET ORAL ONCE
Status: COMPLETED | OUTPATIENT
Start: 2023-03-08 | End: 2023-03-08

## 2023-03-08 RX ORDER — KETOROLAC TROMETHAMINE 15 MG/ML
15 INJECTION, SOLUTION INTRAMUSCULAR; INTRAVENOUS ONCE
Status: COMPLETED | OUTPATIENT
Start: 2023-03-08 | End: 2023-03-08

## 2023-03-08 RX ADMIN — PANTOPRAZOLE SODIUM 40 MG: 40 TABLET, DELAYED RELEASE ORAL at 05:34

## 2023-03-08 RX ADMIN — ACETAMINOPHEN 1000 MG: 500 TABLET ORAL at 03:54

## 2023-03-08 RX ADMIN — IOPAMIDOL 75 ML: 755 INJECTION, SOLUTION INTRAVENOUS at 17:07

## 2023-03-08 RX ADMIN — SODIUM CHLORIDE, PRESERVATIVE FREE 10 ML: 5 INJECTION INTRAVENOUS at 21:31

## 2023-03-08 RX ADMIN — MORPHINE SULFATE 2 MG: 2 INJECTION, SOLUTION INTRAMUSCULAR; INTRAVENOUS at 06:32

## 2023-03-08 RX ADMIN — LISINOPRIL 20 MG: 20 TABLET ORAL at 21:31

## 2023-03-08 RX ADMIN — ENOXAPARIN SODIUM 40 MG: 100 INJECTION SUBCUTANEOUS at 09:28

## 2023-03-08 RX ADMIN — CYCLOBENZAPRINE 5 MG: 10 TABLET, FILM COATED ORAL at 21:31

## 2023-03-08 RX ADMIN — SODIUM CHLORIDE, PRESERVATIVE FREE 10 ML: 5 INJECTION INTRAVENOUS at 09:34

## 2023-03-08 RX ADMIN — LORAZEPAM 0.5 MG: 0.5 TABLET ORAL at 05:34

## 2023-03-08 RX ADMIN — ASPIRIN 81 MG CHEWABLE TABLET 81 MG: 81 TABLET CHEWABLE at 09:27

## 2023-03-08 RX ADMIN — KETOROLAC TROMETHAMINE 15 MG: 15 INJECTION, SOLUTION INTRAMUSCULAR; INTRAVENOUS at 16:10

## 2023-03-08 RX ADMIN — LORAZEPAM 0.5 MG: 0.5 TABLET ORAL at 17:30

## 2023-03-08 RX ADMIN — DOCUSATE SODIUM 100 MG: 100 CAPSULE, LIQUID FILLED ORAL at 09:27

## 2023-03-08 RX ADMIN — ONDANSETRON 4 MG: 2 INJECTION INTRAMUSCULAR; INTRAVENOUS at 06:32

## 2023-03-08 ASSESSMENT — PAIN DESCRIPTION - DESCRIPTORS
DESCRIPTORS: ACHING;DISCOMFORT
DESCRIPTORS: ACHING;DISCOMFORT

## 2023-03-08 ASSESSMENT — PAIN DESCRIPTION - PAIN TYPE: TYPE: CHRONIC PAIN

## 2023-03-08 ASSESSMENT — PAIN SCALES - GENERAL
PAINLEVEL_OUTOF10: 4
PAINLEVEL_OUTOF10: 0
PAINLEVEL_OUTOF10: 7

## 2023-03-08 ASSESSMENT — PAIN DESCRIPTION - LOCATION
LOCATION: HEAD
LOCATION: HEAD

## 2023-03-08 ASSESSMENT — PAIN DESCRIPTION - ORIENTATION
ORIENTATION: MID
ORIENTATION: MID

## 2023-03-08 ASSESSMENT — PAIN - FUNCTIONAL ASSESSMENT: PAIN_FUNCTIONAL_ASSESSMENT: ACTIVITIES ARE NOT PREVENTED

## 2023-03-08 NOTE — PROGRESS NOTES
Hospitalist Progress Note      PCP: Gamal Sherman MD    Date of Admission: 3/7/2023    Chief Complaint:   Chief Complaint   Patient presents with    Other     Slight chest pain with cramping in center/left. Started with neck pain/cramping both sides a week ago. Was at Dr. Fransico Brown and sent in today       Hospital Course: 82-year-old female with past medical history significant for arthritis, fibromyalgia, previous TIA/CVA presents with complaints of chest pain. Her initial troponin was negative. EKG shows sinus bradycardia. Subjective: reports persistent chest pain      Medications:  Reviewed    Infusion Medications    sodium chloride       Scheduled Medications    atorvastatin  20 mg Oral Nightly    cyclobenzaprine  5 mg Oral Nightly    sodium chloride flush  5-40 mL IntraVENous 2 times per day    aspirin  81 mg Oral Daily    enoxaparin  40 mg SubCUTAneous Daily    docusate sodium  100 mg Oral QAM    lisinopril  20 mg Oral Nightly    mometasone-formoterol  2 puff Inhalation BID    pantoprazole  40 mg Oral QAM AC     PRN Meds: sodium chloride flush, sodium chloride, ondansetron **OR** ondansetron, acetaminophen **OR** acetaminophen, polyethylene glycol, albuterol sulfate HFA, LORazepam, zolpidem, perflutren lipid microspheres      Intake/Output Summary (Last 24 hours) at 3/8/2023 1516  Last data filed at 3/8/2023 0058  Gross per 24 hour   Intake --   Output 400 ml   Net -400 ml       Physical Exam Performed:    BP (!) 148/77   Pulse 58   Temp 97.7 °F (36.5 °C) (Oral)   Resp 16   Ht 4' 11\" (1.499 m)   Wt 139 lb 12.4 oz (63.4 kg)   SpO2 95%   BMI 28.23 kg/m²     General appearance: Elderly  HEENT: Pupils equal, round, and reactive to light. Conjunctivae/corneas clear. Neck: Supple, with full range of motion. No jugular venous distention. Trachea midline. Respiratory:  Normal respiratory effort. Clear to auscultation, bilaterally without Rales/Wheezes/Rhonchi.   Cardiovascular: Regular rate and rhythm with normal S1/S2 without murmurs, rubs or gallops. Abdomen: Soft, non-tender, non-distended with normal bowel sounds. Musculoskeletal: No clubbing, cyanosis or edema bilaterally. Full range of motion without deformity. Skin: Skin color, texture, turgor normal.  No rashes or lesions. Neurologic:  Neurovascularly intact without any focal sensory/motor deficits. Cranial nerves: II-XII intact, grossly non-focal.  Psychiatric: Alert and oriented, thought content appropriate, normal insight  Capillary Refill: Brisk, 3 seconds, normal   Peripheral Pulses: +2 palpable, equal bilaterally       Labs:   Recent Labs     03/07/23  1319 03/08/23  0653   WBC 5.7 4.4   HGB 12.5 12.5   HCT 37.2 37.3    162     Recent Labs     03/07/23  1319      K 4.7      CO2 23   BUN 14   CREATININE 0.9   CALCIUM 9.5     Recent Labs     03/07/23  1319   AST 16   ALT 9*   BILITOT 0.3   ALKPHOS 80     No results for input(s): INR in the last 72 hours. Recent Labs     03/07/23  1945 03/08/23  0205 03/08/23  0653   TROPONINI 0.02* <0.01 <0.01       Urinalysis:      Lab Results   Component Value Date/Time    NITRU Negative 06/04/2021 06:57 PM    WBCUA 4 06/04/2021 06:57 PM    RBCUA 2 06/04/2021 06:57 PM    BLOODU Negative 06/04/2021 06:57 PM    SPECGRAV 1.018 06/04/2021 06:57 PM    GLUCOSEU Negative 06/04/2021 06:57 PM    GLUCOSEU NEGATIVE 09/14/2011 08:30 AM       Radiology:  XR CHEST PORTABLE   Final Result   Mild bibasilar atelectasis.          VL DUP CAROTID BILATERAL    (Results Pending)       IP CONSULT TO CARDIOLOGY    Assessment/Plan:    Active Hospital Problems    Diagnosis     Chest pain [R07.9]      Atypical chest pain   Most likely musculoskeletal in nature   We will treat with 1 dose of muscle relaxant and NSAID and reassess   Suspecting radicular pain, check CT cervical   Cardiology has been consulted, recommendations pending    Essential Hypertension  Controlled  Resume home medications (lisinopril) Monitor BP q4 hrs  BP goal <140/90  Adjust medications accordingly       Other comorbidities include: Psoriasis, GERD, TIA    DVT Prophylaxis: +  Diet: ADULT DIET; Regular;  Low Sodium (2 gm)  Code Status: Full Code  PT/OT Eval Status: +    Dispo - pending clinical improvement       Nano Viveros MD

## 2023-03-08 NOTE — PLAN OF CARE
Problem: Discharge Planning  Goal: Discharge to home or other facility with appropriate resources  3/8/2023 1108 by Aurora Mancia RN  Outcome: Progressing  Flowsheets (Taken 3/8/2023 0900)  Discharge to home or other facility with appropriate resources: Identify barriers to discharge with patient and caregiver  3/7/2023 2244 by Elsy Kimble RN  Outcome: Femi Argueta (Taken 3/7/2023 1717 by Aurora Mancia, RN)  Discharge to home or other facility with appropriate resources: Identify barriers to discharge with patient and caregiver     Problem: Pain  Goal: Verbalizes/displays adequate comfort level or baseline comfort level  3/8/2023 1108 by Aurora Mancia RN  Outcome: Progressing  3/7/2023 2244 by Elsy Kimble RN  Outcome: Progressing     Problem: Safety - Adult  Goal: Free from fall injury  3/8/2023 1108 by Aurora Mancia RN  Outcome: Progressing  3/7/2023 2244 by Elsy Kimble RN  Outcome: Progressing     Problem: ABCDS Injury Assessment  Goal: Absence of physical injury  3/8/2023 1108 by Aurora Mancia RN  Outcome: Progressing  3/7/2023 2244 by Elsy Kimble RN  Outcome: Progressing

## 2023-03-08 NOTE — CONSULTS
[address]  Cardiology Consult Note        CC:      Chest pain             HPI:   This is a 82 y.o. female who presents with chest pain. It appears to have started since Saturday when she was trying to lift a portion of her mattress. Since then she has been having increasing pain in both sides of her neck. It has now migrated down into her chest associated with left arm discomfort. She claims that anytime she exerts herself she gets the pain and the shortness of breath. She was sweaty on Sunday. Has hx of hyper tension but no cardiac disease. Patient had a stress test in December 2021 which was negative. Interrogation the patient states that she has been under a lot of stress. She takes Excedrin daily for migraines. She has been having cramp-like pains on the side of her neck going back to the head jaw. Lately has been going down her left arm and then now the right arm. She has had it across the chest as well. It appears to be present all the time but it is worse with activity or exertion. There is no precipitation with change in neck movements. No associated shortness of breath nausea vomiting.     Past Medical History:   Diagnosis Date    Anesthesia complication     states under general anesthesia has trouble awaking    Arthritis     fibramyalgia, osteo, DJD    Cancer ([facility] [address]     skin cancer removed from left arm    GERD (gastroesophageal reflux disease)     Nausea & vomiting     Psoriasis     scalp, front of legs and back of arms    Unspecified cerebral artery occlusion with cerebral infarction 5 years ago    several TIA      Past Surgical History:   Procedure Laterality Date    BUNIONECTOMY Right     EXAM UNDER GENERAL ANESTHESIA OF NASOPHARYNX      HAND SURGERY Right     related to arthritis    NOSE SURGERY  4 years ago    1) nasal fracture and 2nd) fungal infection removed    SKIN CANCER EXCISION Left 2014      Family History   Problem Relation Age of Onset    Asthma Mother     Heart Disease Mother     Cancer Mother     Heart Disease Father     Early Death Brother     Cancer Brother         esophogeal cancer    Asthma Brother     Heart Disease Brother       Social History     Tobacco Use    Smoking status: Former    Smokeless tobacco: Never    Tobacco comments:     smokes as a teenager quit when she was 23years old   Vaping Use    Vaping Use: Never used   Substance Use Topics    Alcohol use: No    Drug use: No      Allergies   Allergen Reactions    Sulfa Antibiotics Hives and Other (See Comments)     Mouth sores  Other reaction(s): Unknown  Sore tongue    Azithromycin Rash    Hydrocodone-Acetaminophen Nausea And Vomiting    Morphine Nausea And Vomiting     hallucination    Nitrofurantoin Rash      sodium chloride flush  5-40 mL IntraVENous 2 times per day    aspirin  81 mg Oral Daily    atorvastatin  80 mg Oral Nightly    enoxaparin  40 mg SubCUTAneous Daily    docusate sodium  100 mg Oral QAM    lisinopril  20 mg Oral Nightly    mometasone-formoterol  2 puff Inhalation BID    pantoprazole  40 mg Oral QAM AC       Review of Systems -   Constitutional: Negative for weight gain/loss; malaise, fever  Respiratory: Negative for Asthma;  cough and hemoptysis  Cardiovascular: Negative for palpitations,dizziness   Gastrointestinal: Negative for abd.pain; constipation/diarrhea;    Genitourinary: Negative for stones; hematuria; frequency hesitancy  Integumentt: Negative for rash or pruritis  Hematologic/lymphatic: Negative for blood dyscrasia; leukemia/lymphoma  Musculoskeletal: Negative for Connective tissue disease  Neurological:  Negative for Seizure   Behavioral/Psych:Negative for Bipolar disorder, Schizophrenia; Dementia  Endocrine: negative for thyroid, parathyroid disease      Intake/Output Summary (Last 24 hours) at 3/8/2023 0912  Last data filed at 3/8/2023 0058  Gross per 24 hour   Intake --   Output 400 ml   Net -400 ml       Physical Examination:  BP (!) 150/82   Pulse 50   Temp 97.7 °F (36.5 °C) (Oral)   Resp 16   Ht 4' 11\" (1.499 m)   Wt 139 lb 12.4 oz (63.4 kg)   SpO2 94%   BMI 28.23 kg/m²    HEENT:  Face: Atraumatic, Conjunctiva: Pink; non icteric,  Mucous Memb:  Moist, No thyromegaly or Lymphadenopathy  Respiratory:  Resp Assessment: normal, Resp Auscultation: clear   Cardiovascular: Auscultation: nl S1 & S2, Palpation:  Nl PMI;  No heaves or thrills, JVP:  normal  Abdomen: Soft, non-tender, Normal bowel sounds,  No organomegaly  Extremities: No Cyanosis or Clubbing; Edema none  Neurological: Oriented to time, place, and person, Non-anxious  Psychiatric: Normal mood and affect  Skin: Warm and dry,  No rash seen      Current Facility-Administered Medications: sodium chloride flush 0.9 % injection 5-40 mL, 5-40 mL, IntraVENous, 2 times per day  sodium chloride flush 0.9 % injection 5-40 mL, 5-40 mL, IntraVENous, PRN  0.9 % sodium chloride infusion, , IntraVENous, PRN  ondansetron (ZOFRAN-ODT) disintegrating tablet 4 mg, 4 mg, Oral, Q8H PRN **OR** ondansetron (ZOFRAN) injection 4 mg, 4 mg, IntraVENous, Q6H PRN  acetaminophen (TYLENOL) tablet 650 mg, 650 mg, Oral, Q6H PRN **OR** acetaminophen (TYLENOL) suppository 650 mg, 650 mg, Rectal, Q6H PRN  polyethylene glycol (GLYCOLAX) packet 17 g, 17 g, Oral, Daily PRN  aspirin chewable tablet 81 mg, 81 mg, Oral, Daily  atorvastatin (LIPITOR) tablet 80 mg, 80 mg, Oral, Nightly  enoxaparin (LOVENOX) injection 40 mg, 40 mg, SubCUTAneous, Daily  albuterol sulfate HFA (PROVENTIL;VENTOLIN;PROAIR) 108 (90 Base) MCG/ACT inhaler 2 puff, 2 puff, Inhalation, Q6H PRN  docusate sodium (COLACE) capsule 100 mg, 100 mg, Oral, QAM  lisinopril (PRINIVIL;ZESTRIL) tablet 20 mg, 20 mg, Oral, Nightly  LORazepam (ATIVAN) tablet 0.5 mg, 0.5 mg, Oral, Q6H PRN  mometasone-formoterol (DULERA) 200-5 MCG/ACT inhaler 2 puff, 2 puff, Inhalation, BID  zolpidem (AMBIEN) tablet 5 mg, 5 mg, Oral, Nightly PRN  perflutren lipid microspheres (DEFINITY) injection 1.5 mL, 1.5 mL, IntraVENous, ONCE PRN  pantoprazole (PROTONIX) tablet 40 mg, 40 mg, Oral, QAM AC      Labs:   Recent Labs     03/07/23  1319 03/08/23  0653   WBC 5.7 4.4   HGB 12.5 12.5   HCT 37.2 37.3    162     Recent Labs     03/07/23  1319      K 4.7   CO2 23   BUN 14   CREATININE 0.9   GLUCOSE 94     No results for input(s): BNP in the last 72 hours. No results for input(s): PROTIME, INR in the last 72 hours. No results for input(s): APTT in the last 72 hours. Recent Labs     03/07/23  1945 03/08/23  0205 03/08/23  0653   TROPONINI 0.02* <0.01 <0.01     Lab Results   Component Value Date/Time    HDL 62 12/10/2021 06:27 AM    LDLCALC 130 12/10/2021 06:27 AM    TRIG 99 12/10/2021 06:27 AM     Recent Labs     03/07/23  1319   AST 16   ALT 9*   LABALBU 4.0         EKG:   Ectopic atrial beats no ischemic ST changes    Chest X-Ray:  Basilar atelectasis    ECHO:2/19  Normal LV size and wall motion. EF is ~ 60%. Indeterminate diastolic function. The left atrium is normal in size. Normal right ventricular size and function. Mild Mitral and Tricuspid regurgitation. Systolic pulmonary artery pressure (SPAP) is estimated at 44 mmHg consistent with mild pulmonary hypertension (RA pressure 3  mmHg). Stress Nuclear: 12/2021  1. Technically a satisfactory study. 2. No evidence of Ischemia by Myocardial Perfusion Imaging. 3. Gated Study shows normal LV size and Systolic function; EF is 69 %. ASSESSMENT AND PLAN:      Chest pain atypical for angina  She describes bilateral neck pain going back of the ears to her head and to the front as well  Present constantly,  but worse with activity or exertion. States she was having pain as she and I were talking. First 2 troponins were elevated at 0.2 is very borderline. The next one is 0.01. EKG showed no ischemic ST changes     I am not convinced this is angina, but considered doing a stress test to  corroborate my clinical impression!   She expresses concern, anxiety regarding repeat stress testing as she had horrible experience in the past and does not want to.       Sree Diaz M.D  3/8/2023

## 2023-03-08 NOTE — H&P
Hospital Medicine History & Physical      PCP: Yesenia Singh MD    Date of Admission: 3/7/2023    Chief Complaint:    Chief Complaint   Patient presents with    Other     Slight chest pain with cramping in center/left. Started with neck pain/cramping both sides a week ago. Was at Dr. Rui Rose and sent in today       History Of Present Illness:    82-year-old female with past medical history of CVA who presents to the hospital due to chest pain according to the patient her chest pain is in the center of her chest, she also had associated neck cramping as well as left arm numbness. Patient had associated shortness of breath. Patient otherwise denied fevers chills diarrhea constipation dysuria. She mentions her chest pain is associated with exertion, dull in nature, center of her chest, 4/5 in intensity, Nonradiating. Past Medical History:          Diagnosis Date    Anesthesia complication     states under general anesthesia has trouble awaking    Arthritis     fibramyalgia, osteo, DJD    Cancer ([facility] [address]     skin cancer removed from left arm    GERD (gastroesophageal reflux disease)     Nausea & vomiting     Psoriasis     scalp, front of legs and back of arms    Unspecified cerebral artery occlusion with cerebral infarction 5 years ago    several TIA       Past Surgical History:          Procedure Laterality Date    BUNIONECTOMY Right     EXAM UNDER GENERAL ANESTHESIA OF NASOPHARYNX      HAND SURGERY Right     related to arthritis    NOSE SURGERY  4 years ago    1) nasal fracture and 2nd) fungal infection removed    SKIN CANCER EXCISION Left 2014       Medications Prior to Admission:      Prior to Admission medications    Medication Sig Start Date End Date Taking? Authorizing Provider   LORazepam (ATIVAN) 0.5 MG tablet Take 0.5 mg by mouth every 6 hours as needed for Anxiety.    Yes Historical Provider, MD   mometasone-formoterol (DULERA) 200-5 MCG/ACT inhaler Inhale 2 puffs into the lungs every 12 hours Yes Historical Provider, MD   docusate sodium (COLACE) 100 MG capsule Take 100 mg by mouth every morning   Yes Historical Provider, MD   lisinopril (PRINIVIL;ZESTRIL) 10 MG tablet Take 10 mg by mouth every morning   Yes Historical Provider, MD   lisinopril (PRINIVIL;ZESTRIL) 20 MG tablet Take 20 mg by mouth at bedtime   Yes Historical Provider, MD   zolpidem (AMBIEN) 5 MG tablet Take 5 mg by mouth nightly as needed for Sleep. Yes Historical Provider, MD   omeprazole (PRILOSEC) 40 MG delayed release capsule take one capsule by mouth daily 1/15/19   Historical Provider, MD   vitamin D (ERGOCALCIFEROL) 98865 units CAPS capsule Take 1 capsule by mouth once a week Friday 4/20/18   Historical Provider, MD   albuterol sulfate  (90 Base) MCG/ACT inhaler Inhale 2 puffs into the lungs every 6 hours as needed for Shortness of Breath  2/26/18   Historical Provider, MD   Multiple Vitamins-Minerals (THERAPEUTIC MULTIVITAMIN-MINERALS) tablet Take 1 tablet by mouth daily. Historical Provider, MD       Allergies:  Sulfa antibiotics, Azithromycin, Hydrocodone-acetaminophen, Morphine, and Nitrofurantoin    Social History:      TOBACCO:   reports that she has quit smoking. She has never used smokeless tobacco.  ETOH:   reports no history of alcohol use. Family History:       Reviewed in detail and non contributory          Problem Relation Age of Onset    Asthma Mother     Heart Disease Mother     Cancer Mother     Heart Disease Father     Early Death Brother     Cancer Brother         esophogeal cancer    Asthma Brother     Heart Disease Brother        REVIEW OF SYSTEMS:   Pertinent positives as noted in the HPI. All other systems reviewed and negative. PHYSICAL EXAM PERFORMED:    BP (!) 173/92   Pulse 56   Temp 97.9 °F (36.6 °C) (Oral)   Resp 16   Ht 4' 11\" (1.499 m)   Wt 159 lb 9.8 oz (72.4 kg)   SpO2 97%   BMI 32.24 kg/m²     General appearance:  No apparent distress, cooperative.   HEENT:  Normal cephalic, atraumatic without obvious deformity. Conjunctivae/corneas clear. Neck: Supple, with full range of motion. No cervical lymphadenopathy  Respiratory:  Normal respiratory effort. Clear to auscultation, bilaterally without Rales/Wheezes/Rhonchi. Cardiovascular:  Regular rate and rhythm with normal S1/S2 without murmurs, rubs or gallops. Abdomen: Soft, non-tender, non-distended, normal bowel sounds. Musculoskeletal:  No edema noted bilaterally. No tenderness on palpation   Skin: no rash visible  Neurologic:  Neurologically intact without any focal sensory/motor deficits. grossly non-focal.  Psychiatric:  Alert and oriented, normal mood  Peripheral Pulses: +2 palpable, equal bilaterally       Labs:     Recent Labs     03/07/23  1319   WBC 5.7   HGB 12.5   HCT 37.2        Recent Labs     03/07/23  1319      K 4.7      CO2 23   BUN 14   CREATININE 0.9   CALCIUM 9.5     Recent Labs     03/07/23  1319   AST 16   ALT 9*   BILITOT 0.3   ALKPHOS 80     No results for input(s): INR in the last 72 hours. Recent Labs     03/07/23  1319 03/07/23  1749 03/07/23  1945   TROPONINI 0.02* 0.02* 0.02*       Urinalysis:      Lab Results   Component Value Date/Time    NITRU Negative 06/04/2021 06:57 PM    WBCUA 4 06/04/2021 06:57 PM    RBCUA 2 06/04/2021 06:57 PM    BLOODU Negative 06/04/2021 06:57 PM    SPECGRAV 1.018 06/04/2021 06:57 PM    GLUCOSEU Negative 06/04/2021 06:57 PM    GLUCOSEU NEGATIVE 09/14/2011 08:30 AM       Radiology:       XR CHEST PORTABLE   Final Result   Mild bibasilar atelectasis. Active Hospital Problems    Diagnosis Date Noted    Chest pain [R07.9] 12/09/2021         82year-old female with past medical history of CVA who presents to the hospital due to chest pain according to the patient her chest pain is in the center of her chest, she also had associated neck cramping as well as left arm numbness. Patient had associated shortness of breath.   Patient otherwise denied fevers chills diarrhea constipation dysuria. She mentions her chest pain is associated with exertion, dull in nature, center of her chest, 4/5 in intensity, Nonradiating. Assessment  Chest pain rule out ACS  Elevated troponin  History of CVA  Anxiety history      Plan  Monitor on cardiac telemetry  Monitor troponin  Consult cardiology  Order echocardiogram  According to patient she recently had cardiac stress test performed-which was negative for ischemia, she does not want repeat cardiac a stress test, will defer further cardiac testing to cardiology  DVT prophylaxis-Lovenox  Resume home medications  Diet: ADULT DIET; Clear Liquid; No Caffeine  Code Status: Full Code    PT/OT Eval Status: ordered    Dispo - pending clinical improvement       Shira Fink MD    The note was completed using EMR and Dragon dictation system. Every effort was made to ensure accuracy; however, inadvertent computerized transcription errors may be present. Thank you Carlos Leon MD for the opportunity to be involved in this patient's care. If you have any questions or concerns please feel free to contact me at [phone].     Shira Fink MD

## 2023-03-08 NOTE — PLAN OF CARE
Problem: Discharge Planning  Goal: Discharge to home or other facility with appropriate resources  3/8/2023 1108 by Maria Elena Blue RN  Outcome: Progressing  3/8/2023 1108 by Maria Elena Blue RN  Outcome: Progressing  Flowsheets (Taken 3/8/2023 0900)  Discharge to home or other facility with appropriate resources: Identify barriers to discharge with patient and caregiver  3/7/2023 2244 by Deacon Koo RN  Outcome: Progressing  Flowsheets (Taken 3/7/2023 1717 by Maria Elena Blue RN)  Discharge to home or other facility with appropriate resources: Identify barriers to discharge with patient and caregiver     Problem: Pain  Goal: Verbalizes/displays adequate comfort level or baseline comfort level  3/8/2023 1108 by Maria Elena Blue RN  Outcome: Progressing  3/8/2023 1108 by Maria Elena Blue RN  Outcome: Progressing  3/7/2023 2244 by Deacon Koo RN  Outcome: Progressing     Problem: Safety - Adult  Goal: Free from fall injury  3/8/2023 1108 by Maria Elena Blue RN  Outcome: Progressing  3/8/2023 1108 by Maria Elena Blue RN  Outcome: Progressing  3/7/2023 2244 by Deacon Koo RN  Outcome: Progressing     Problem: ABCDS Injury Assessment  Goal: Absence of physical injury  3/8/2023 1108 by Maria Elena Blue RN  Outcome: Progressing  3/8/2023 1108 by Maria Elena Blue RN  Outcome: Progressing  3/7/2023 2244 by Deacon Koo RN  Outcome: Progressing

## 2023-03-08 NOTE — PROGRESS NOTES
Pt c/o increased chest pressure that radiated up to jaw and left arm. STAT EKG ordered. /83. Notified Dr. Didier Cabrera. New orders placed.     Electronically signed by Shy Callahan RN on 3/8/2023 at 6:21 AM

## 2023-03-08 NOTE — PROGRESS NOTES
Pt c/o headache, states tylenol didn't help when given earlier. Message sent to Dr. Honey Dowell.     Electronically signed by Altagracia Pryor RN on 3/8/2023 at 3:36 AM

## 2023-03-08 NOTE — PLAN OF CARE
Problem: Discharge Planning  Goal: Discharge to home or other facility with appropriate resources  Outcome: Progressing  Flowsheets (Taken 3/7/2023 1717 by Ilir Brandon RN)  Discharge to home or other facility with appropriate resources: Identify barriers to discharge with patient and caregiver     Problem: Pain  Goal: Verbalizes/displays adequate comfort level or baseline comfort level  Outcome: Progressing     Problem: Safety - Adult  Goal: Free from fall injury  Outcome: Progressing     Problem: ABCDS Injury Assessment  Goal: Absence of physical injury  Outcome: Progressing

## 2023-03-08 NOTE — PROGRESS NOTES
EKG reviewed by Dr. Magdalena Cantrell. Pt made NPO, educated about NPO status. Cardio already consulted.

## 2023-03-09 VITALS
OXYGEN SATURATION: 95 % | HEART RATE: 63 BPM | BODY MASS INDEX: 28.89 KG/M2 | WEIGHT: 143.3 LBS | RESPIRATION RATE: 18 BRPM | DIASTOLIC BLOOD PRESSURE: 76 MMHG | HEIGHT: 59 IN | TEMPERATURE: 97.4 F | SYSTOLIC BLOOD PRESSURE: 148 MMHG

## 2023-03-09 LAB
ANION GAP SERPL CALCULATED.3IONS-SCNC: 11 MMOL/L (ref 3–16)
BUN BLDV-MCNC: 13 MG/DL (ref 7–20)
CALCIUM SERPL-MCNC: 8.9 MG/DL (ref 8.3–10.6)
CHLORIDE BLD-SCNC: 101 MMOL/L (ref 99–110)
CO2: 23 MMOL/L (ref 21–32)
CREAT SERPL-MCNC: 0.9 MG/DL (ref 0.6–1.2)
GFR SERPL CREATININE-BSD FRML MDRD: >60 ML/MIN/{1.73_M2}
GLUCOSE BLD-MCNC: 88 MG/DL (ref 70–99)
POTASSIUM SERPL-SCNC: 4 MMOL/L (ref 3.5–5.1)
SODIUM BLD-SCNC: 135 MMOL/L (ref 136–145)

## 2023-03-09 PROCEDURE — 80048 BASIC METABOLIC PNL TOTAL CA: CPT

## 2023-03-09 PROCEDURE — 99232 SBSQ HOSP IP/OBS MODERATE 35: CPT | Performed by: INTERNAL MEDICINE

## 2023-03-09 PROCEDURE — 2580000003 HC RX 258: Performed by: INTERNAL MEDICINE

## 2023-03-09 PROCEDURE — 97162 PT EVAL MOD COMPLEX 30 MIN: CPT

## 2023-03-09 PROCEDURE — 94760 N-INVAS EAR/PLS OXIMETRY 1: CPT

## 2023-03-09 PROCEDURE — 6370000000 HC RX 637 (ALT 250 FOR IP): Performed by: INTERNAL MEDICINE

## 2023-03-09 PROCEDURE — 36415 COLL VENOUS BLD VENIPUNCTURE: CPT

## 2023-03-09 PROCEDURE — 97166 OT EVAL MOD COMPLEX 45 MIN: CPT

## 2023-03-09 PROCEDURE — 93880 EXTRACRANIAL BILAT STUDY: CPT

## 2023-03-09 PROCEDURE — 97530 THERAPEUTIC ACTIVITIES: CPT

## 2023-03-09 RX ADMIN — LORAZEPAM 0.5 MG: 0.5 TABLET ORAL at 07:56

## 2023-03-09 RX ADMIN — DOCUSATE SODIUM 100 MG: 100 CAPSULE, LIQUID FILLED ORAL at 07:56

## 2023-03-09 RX ADMIN — ASPIRIN 81 MG CHEWABLE TABLET 81 MG: 81 TABLET CHEWABLE at 07:56

## 2023-03-09 RX ADMIN — SODIUM CHLORIDE, PRESERVATIVE FREE 10 ML: 5 INJECTION INTRAVENOUS at 07:56

## 2023-03-09 RX ADMIN — PANTOPRAZOLE SODIUM 40 MG: 40 TABLET, DELAYED RELEASE ORAL at 06:20

## 2023-03-09 RX ADMIN — ACETAMINOPHEN 650 MG: 325 TABLET ORAL at 11:59

## 2023-03-09 ASSESSMENT — PAIN DESCRIPTION - FREQUENCY: FREQUENCY: CONTINUOUS

## 2023-03-09 ASSESSMENT — PAIN SCALES - GENERAL
PAINLEVEL_OUTOF10: 8
PAINLEVEL_OUTOF10: 4

## 2023-03-09 ASSESSMENT — PAIN DESCRIPTION - ONSET: ONSET: ON-GOING

## 2023-03-09 ASSESSMENT — PAIN DESCRIPTION - ORIENTATION: ORIENTATION: MID

## 2023-03-09 ASSESSMENT — PAIN DESCRIPTION - LOCATION
LOCATION: HEAD
LOCATION: HEAD

## 2023-03-09 ASSESSMENT — PAIN DESCRIPTION - DESCRIPTORS: DESCRIPTORS: ACHING;DISCOMFORT

## 2023-03-09 ASSESSMENT — PAIN - FUNCTIONAL ASSESSMENT: PAIN_FUNCTIONAL_ASSESSMENT: ACTIVITIES ARE NOT PREVENTED

## 2023-03-09 ASSESSMENT — PAIN DESCRIPTION - PAIN TYPE: TYPE: CHRONIC PAIN

## 2023-03-09 NOTE — PROGRESS NOTES
Occupational Therapy  Facility/Department: [facility]  Occupational Therapy Initial Assessment    Name: Jose Ny  : 1935  MRN: [id number]  Date of Service: 3/9/2023    Discharge Recommendations:  Continue to assess pending progress, Home with assist PRN  OT Equipment Recommendations  Equipment Needed: Yes  Mobility Devices: ADL Assistive Devices  ADL Assistive Devices: Shower Chair with back;Grab Bars - shower;Grab Bars - toilet   Jose Ny scored a 21/24 on the AM-PAC ADL Inpatient form. At this time, no further OT is recommended upon discharge due to pt nearing baseline. Recommend patient returns to prior setting with prior services. Patient Diagnosis(es): The primary encounter diagnosis was Acute chest pain. A diagnosis of Elevated troponin was also pertinent to this visit. Past Medical History:  has a past medical history of Anesthesia complication, Arthritis, Cancer ([facility] [address] GERD (gastroesophageal reflux disease), Nausea & vomiting, Psoriasis, and Unspecified cerebral artery occlusion with cerebral infarction. Past Surgical History:  has a past surgical history that includes Skin cancer excision (Left, ); Hand surgery (Right); Nose surgery (4 years ago); Nasopharynx Exm Under Gen Anesth; and Bunionectomy (Right). Treatment Diagnosis: Decreased functional mobility, ADLs, and balance    Assessment   Performance deficits / Impairments: Decreased functional mobility ; Decreased ADL status; Decreased strength;Decreased endurance;Decreased sensation;Decreased balance  Assessment: Pt is an 81 yo F admitted with chest pain. PTA, pt lives alone in a condo and was IND with ADLs, IADLs, and functional mobility. Pt reports using a cane outside of the home but does not require AD for mobility in the home. Today, pt is below baseline, requiring CGA for transfers and mobility. Pt reported feeling dizzy and often reached for furniture in the room for balance.  ADLs were not assessed during this session, however anticipate pt will require up to min A for ADL completion based on the strength and endurance seen today. OT will continue to see while on acute to address current deficits and to continue to assess current ADL performance. As long as dizziness and BP are under control, anticipate pt will be safe to return home with assist PRN once medically stable. Treatment Diagnosis: Decreased functional mobility, ADLs, and balance  Prognosis: Fair  Decision Making: Medium Complexity  REQUIRES OT FOLLOW-UP: Yes  Activity Tolerance  Activity Tolerance: Treatment limited secondary to medical complications (free text)  Activity Tolerance Comments: Pt reports feeling dizzy and nauseated; /102,         Plan   Occupational Therapy Plan  Times Per Week: 3-5  Times Per Day: Once a day  Current Treatment Recommendations: Strengthening, Balance training, Functional mobility training, Endurance training, Safety education & training, Self-Care / ADL     Restrictions  Restrictions/Precautions  Restrictions/Precautions: Fall Risk    Subjective   General  Chart Reviewed: Yes  Patient assessed for rehabilitation services?: Yes  Additional Pertinent Hx: Pt is an 83 yo F admitted with chest pain. Family / Caregiver Present: No  Referring Practitioner: Isabel Mahmood MD  Diagnosis: Chest pain  Subjective  Subjective: Pt met b/s for OT eval/tx with PT. Pt in bathroom, agreeable to therapy. Pt reports feeling dizzy.      Social/Functional History  Social/Functional History  Lives With: Alone  Type of Home: Condo  Home Layout: Two level  Home Access: Level entry  Bathroom Shower/Tub: Tub/Shower unit, Walk-in shower (primaryily uses walk-in shower)  Bathroom Toilet: Standard  Home Equipment: Cane, Rollator  ADL Assistance: Independent  Homemaking Assistance: Independent  Ambulation Assistance: Independent  Transfer Assistance: Independent  Active : Yes  Mode of Transportation: Car  Occupation: Retired  Type of Occupation: CNA       Objective   Safety Devices  Type of Devices: All fall risk precautions in place;Call light within reach;Gait belt;Patient at risk for falls; Left in chair;Chair alarm in place;Nurse notified     AROM: Within functional limits  Strength: Generally decreased, functional  Coordination: Generally decreased, functional  Tone: Normal  Sensation: Impaired (Pt reports hx of surgery in R foot and lost sensation throughout entire foot.)  ADL  Additional Comments: Pt declined participation in ADLs, reporting that she has already completed them. Anticipate pt will require setup A for feeding, CGA for grooming and UE dressing, min A for toileting, bathing, and LE dressing based on the strength and endurance seen today. Activity Tolerance  Activity Tolerance: Treatment limited secondary to medical complications  Activity Tolerance Comments: Limited due to dizziness  Bed mobility  Supine to Sit: Unable to assess  Sit to Supine: Unable to assess  Bed Mobility Comments: Pt in BR at start of session, in recliner at end of session. Transfers  Sit to stand: Contact guard assistance  Stand to sit: Contact guard assistance  Transfer Comments: Pt completed functional mobility from BR to recliner CGA. No overt LOB noted, however pt reports feeling very dizzy.   Vision  Vision: Within Functional Limits  Hearing  Hearing: Within functional limits  Cognition  Overall Cognitive Status: WNL  Orientation  Overall Orientation Status: Within Normal Limits    Education Given To: Patient  Education Provided: Role of Therapy;Plan of Care;Transfer Training  Education Method: Verbal  Barriers to Learning: None  Education Outcome: Verbalized understanding;Demonstrated understanding    AM-PAC Score  AM-[facility] Inpatient Daily Activity Raw Score: 21 (03/09/23 0908)  AM-PAC Inpatient ADL T-Scale Score : 44.27 (03/09/23 0908)  ADL Inpatient CMS 0-100% Score: 32.79 (03/09/23 0908)  ADL Inpatient CMS G-Code Modifier : CJ (03/09/23 8785)    Goals  Short Term Goals  Time Frame for Short Term Goals: Prior to discharge  Short Term Goal 1: Pt will complete ADL transfers with supervision  Short Term Goal 2: Pt will complete grooming task in stance at sink SBA  Short Term Goal 3: Pt will complete dressing CGA  Short Term Goal 4: Pt will complete bathing CGA  Short Term Goal 5: Pt will complete toileting SBA  Long Term Goals  Time Frame for Long Term Goals : LTG=STG  Patient Goals   Patient goals : to return home       Therapy Time   Individual Concurrent Group Co-treatment   Time In 0830         Time Out 0900         Minutes 30         Timed Code Treatment Minutes: 15 Minutes       KADIE Gordon/JUAN ALBERTO  Therapist was present, directed the patient's care, made skilled judgement, and was responsible for assessment and treatment of the patient.   Electronically signed by Nidia Mohs, OT on 3/9/23 at 9:49 AM EST

## 2023-03-09 NOTE — PLAN OF CARE
Problem: Pain  Goal: Verbalizes/displays adequate comfort level or baseline comfort level  3/8/2023 2346 by Jesus Amor RN  Outcome: Progressing  3/8/2023 1108 by Harika Quarles RN  Outcome: Progressing  3/8/2023 1108 by Harika Quarles RN  Outcome: Progressing

## 2023-03-09 NOTE — PROGRESS NOTES
Verbal and Written discharge instructions was given to the patient including diet and activity. Patient verbalizes understanding of home medication and possible side effects. Pt instructed and verbalizes understanding to call the doctor listed if they should have any complications or worsening of symptoms and verbalizes understanding the importance of keeping follow-up appointments. D/C'd IV patient tolerated well, no signs of bleeding. The written instructions were given to the patient to take home. Awaiting shower chair delivery and daughter to p/u.

## 2023-03-09 NOTE — CARE COORDINATION
CASE MANAGEMENT DISCHARGE SUMMARY: Discharge order noted. Shower chair with back ordered thru [employer]. No homecare needs.      DISCHARGE DATE: 3/9/23    DISCHARGED TO HOME     TRANSPORTATION: Daughter             TIME: Afternoon     PREFERRED PHARMACY: [facility] Marta TOVAR, RN  Case Management  [phone]             Electronically signed by Thania Bro RN on 3/9/2023 at 2:40 PM

## 2023-03-09 NOTE — DISCHARGE INSTR - COC
Continuity of Care Form    Patient Name: Erin Ruiz   :  1935  MRN:  [id number]    Admit date:  3/7/2023  Discharge date:  ***    Code Status Order: Full Code   Advance Directives:     Admitting Physician:  Lu Kehr, MD  PCP: Manju Houston MD    Discharging Nurse: [facility] Unit/Room#: [id number]-9524/7582-94  Discharging Unit Phone Number: ***    Emergency Contact:   Extended Emergency Contact Information  Primary Emergency Contact: Sherri Flowers   [facility] of [address] Phone: [phone]  Mobile Phone: [phone]  Relation: Child  Secondary Emergency Contact: [address] of [address] Phone: [phone]  Relation: Other    Past Surgical History:  Past Surgical History:   Procedure Laterality Date    BUNIONECTOMY Right     EXAM UNDER GENERAL ANESTHESIA OF NASOPHARYNX      HAND SURGERY Right     related to arthritis    NOSE SURGERY  4 years ago    1) nasal fracture and 2nd) fungal infection removed    SKIN CANCER EXCISION Left        Immunization History:   Immunization History   Administered Date(s) Administered    COVID-19, PFIZER PURPLE top, DILUTE for use, (age 15 y+), 30mcg/0.3mL 2021, 2021, 2021       Active Problems:  Patient Active Problem List   Diagnosis Code    Transient ischemic attack G45.9    Chest pain R07.9       Isolation/Infection:   Isolation            No Isolation          Patient Infection Status       Infection Onset Added Last Indicated Last Indicated By Review Planned Expiration Resolved Resolved By    None active    Resolved    COVID-19 22 COVID-19   22     COVID-19 (Rule Out) 22 COVID-19 (Ordered)   22 Rule-Out Test Resulted    C-diff Rule Out 21 Clostridium Difficile Toxin/Antigen (Ordered)   21 Rule-Out Test Resulted            Nurse Assessment:  Last Vital Signs: BP (!) 148/76   Pulse 63   Temp 97.4 °F (36.3 °C) (Oral)   Resp 18   Ht 4' 11\" (1.499 m)   Wt 143 lb 4.8 oz (65 kg)   SpO2 95%   BMI 28.94 kg/m²     Last documented pain score (0-10 scale): Pain Level: 8  Last Weight:   Wt Readings from Last 1 Encounters:   23 143 lb 4.8 oz (65 kg)     Mental Status:  {IP PT MENTAL STATUS:}    IV Access:  { SOLO IV ACCESS:914002142}    Nursing Mobility/ADLs:  Walking   {CHP DME QRNH:540313870}  Transfer  {CHP DME CMLT:769126174}  Bathing  {CHP DME WLUT:262594214}  Dressing  {CHP DME IJND:203584682}  Toileting  {CHP DME INA}  Feeding  {CHP DME KBSB:018850147}  Med Admin  {P DME XBKE:944209472}  Med Delivery   {[facility] MED Delivery:988006201}    Wound Care Documentation and Therapy:        Elimination:  Continence: Bowel: {YES / PN:27847}  Bladder: {YES / NX:47390}  Urinary Catheter: {Urinary Catheter:844824206}   Colostomy/Ileostomy/Ileal Conduit: {YES / WR:44327}       Date of Last BM: ***    Intake/Output Summary (Last 24 hours) at 3/9/2023 1355  Last data filed at 3/9/2023 0835  Gross per 24 hour   Intake 664 ml   Output --   Net 664 ml     I/O last 3 completed shifts: In: 310 [P.O.:300;  I.V.:10]  Out: 400 [Urine:400]    Safety Concerns:     [employer] Safety Concerns:171750489}    Impairments/Disabilities:      [employer] Impairments/Disabilities:594197076}    Nutrition Therapy:  Current Nutrition Therapy:   [employer] Diet List:461301469}    Routes of Feeding: {CHP DME Other Feedings:685479636}  Liquids: {Slp liquid thickness:25299}  Daily Fluid Restriction: {CHP DME Yes amt example:691516329}  Last Modified Barium Swallow with Video (Video Swallowing Test): {Done Not Done TGPH:215871020}    Treatments at the Time of Hospital Discharge:   Respiratory Treatments: ***  Oxygen Therapy:  {Therapy; copd oxygen:57513}  Ventilator:    {AJAY MAYER Vent MGXA:780737384}    Rehab Therapies: {THERAPEUTIC INTERVENTION:7467169125}  Weight Bearing Status/Restrictions: {AJAY MAYER Weight Bearin}  Other Medical Equipment (for information only, NOT a DME order):  {EQUIPMENT:601330212}  Other Treatments: ***    Patient's personal belongings (please select all that are sent with patient):  {CHP DME Belongings:832657769}    RN SIGNATURE:  {Esignature:957651039}    CASE MANAGEMENT/SOCIAL WORK SECTION    Inpatient Status Date: ***    Readmission Risk Assessment Score:  Readmission Risk              Risk of Unplanned Readmission:  10           Discharging to Facility/ Agency   Name:   Address:  Phone:  Fax:    Dialysis Facility (if applicable)   Name:  Address:  Dialysis Schedule:  Phone:  Fax:    / signature: {Esignature:479779426}    PHYSICIAN SECTION    Prognosis: Fair    Condition at Discharge: Stable    Rehab Potential (if transferring to Rehab): Fair    Recommended Labs or Other Treatments After Discharge: Follow-up with PCP within 7 days from discharge, not doing so could have life-threatening consequences. Take medication as instructed;  return to the emergency department if persistent symptoms, experiencing side effects from medication including nausea vomiting or unable to keep medications down. Physician Certification: I certify the above information and transfer of Joshua Inman  is necessary for the continuing treatment of the diagnosis listed and that she requires Home Care for greater 30 days.      Update Admission H&P: No change in H&P    PHYSICIAN SIGNATURE:  Electronically signed by Niko Lu MD on 3/9/23 at 1:55 PM EST Bill As A Line Item Or As Units: Line Item

## 2023-03-09 NOTE — DISCHARGE SUMMARY
Hospital Medicine Discharge Summary    Patient ID: Joshua Inman      Patient's PCP: Ren Healy MD    Admit Date: 3/7/2023     Discharge Date:   03/09/23    Admitting Provider: Lyla Shepard MD     Discharge Provider: Niko Lu MD     Discharge Diagnoses: Active Hospital Problems    Diagnosis     Chest pain [R07.9]        The patient was seen and examined on day of discharge and this discharge summary is in conjunction with any daily progress note from day of discharge. Hospital Course:       Atypical chest pain              Most likely musculoskeletal in nature              We will treat with 1 dose of muscle relaxant and NSAID and reassess              Suspecting radicular pain, cervical spine CT with a spondylolisthesis of C5-7, possibly causing radicular pain. Patient feels better on discharge              Patient was evaluated by cardiology, based on their evaluation, chest pain most likely noncardiac. Her troponin later became normal, patient does not want to pursue stress testing at this time. Essential Hypertension  Controlled  Resume home medications (lisinopril)   Monitor BP q4 hrs  BP goal <140/90  Adjust medications accordingly        Physical Exam Performed:     BP (!) 148/76   Pulse 63   Temp 97.4 °F (36.3 °C) (Oral)   Resp 18   Ht 4' 11\" (1.499 m)   Wt 143 lb 4.8 oz (65 kg)   SpO2 95%   BMI 28.94 kg/m²       General appearance:  No apparent distress, appears stated age and cooperative. HEENT:  Normal cephalic, atraumatic without obvious deformity. Pupils equal, round, and reactive to light. Extra ocular muscles intact. Conjunctivae/corneas clear. Neck: Supple, with full range of motion. No jugular venous distention. Trachea midline. Respiratory:  Normal respiratory effort. Clear to auscultation, bilaterally without Rales/Wheezes/Rhonchi. Cardiovascular:  Regular rate and rhythm with normal S1/S2 without murmurs, rubs or gallops.   Abdomen: Soft, non-tender, non-distended with normal bowel sounds. Musculoskeletal:  No clubbing, cyanosis or edema bilaterally. Full range of motion without deformity. Skin: Skin color, texture, turgor normal.  No rashes or lesions. Neurologic:  Neurovascularly intact without any focal sensory/motor deficits. Cranial nerves: II-XII intact, grossly non-focal.  Psychiatric:  Alert and oriented, thought content appropriate, normal insight  Capillary Refill: Brisk,< 3 seconds   Peripheral Pulses: +2 palpable, equal bilaterally       Labs: For convenience and continuity at follow-up the following most recent labs are provided:      CBC:    Lab Results   Component Value Date/Time    WBC 4.4 03/08/2023 06:53 AM    HGB 12.5 03/08/2023 06:53 AM    HCT 37.3 03/08/2023 06:53 AM     03/08/2023 06:53 AM       Renal:    Lab Results   Component Value Date/Time     03/09/2023 06:39 AM    K 4.0 03/09/2023 06:39 AM    K 4.7 03/07/2023 01:19 PM     03/09/2023 06:39 AM    CO2 23 03/09/2023 06:39 AM    BUN 13 03/09/2023 06:39 AM    CREATININE 0.9 03/09/2023 06:39 AM    CALCIUM 8.9 03/09/2023 06:39 AM         Significant Diagnostic Studies    Radiology:   VL DUP CAROTID BILATERAL         CT CERVICAL SPINE W CONTRAST   Final Result   Mild-to-moderate spondylosis from C5 to C7. No evidence of cervical spine infection. XR CHEST PORTABLE   Final Result   Mild bibasilar atelectasis. Consults:     IP CONSULT TO CARDIOLOGY  IP CONSULT TO HOME CARE NEEDS    Disposition:  home     Condition at Discharge: Stable    Discharge Instructions/Follow-up:  Follow-up with PCP within 7 days from discharge, not doing so could have life-threatening consequences. Take medication as instructed;  return to the emergency department if persistent symptoms, experiencing side effects from medication including nausea vomiting or unable to keep medications down.      Code Status:  Full Code     Activity: activity as tolerated    Diet: cardiac diet      Discharge Medications:     Current Discharge Medication List             Details   LORazepam (ATIVAN) 0.5 MG tablet Take 0.5 mg by mouth every 6 hours as needed for Anxiety. mometasone-formoterol (DULERA) 200-5 MCG/ACT inhaler Inhale 2 puffs into the lungs every 12 hours      docusate sodium (COLACE) 100 MG capsule Take 100 mg by mouth every morning      lisinopril (PRINIVIL;ZESTRIL) 10 MG tablet Take 10 mg by mouth every morning      zolpidem (AMBIEN) 5 MG tablet Take 5 mg by mouth nightly as needed for Sleep.      omeprazole (PRILOSEC) 40 MG delayed release capsule take one capsule by mouth daily  Refills: 1      vitamin D (ERGOCALCIFEROL) 04442 units CAPS capsule Take 1 capsule by mouth once a week Friday      albuterol sulfate  (90 Base) MCG/ACT inhaler Inhale 2 puffs into the lungs every 6 hours as needed for Shortness of Breath       Multiple Vitamins-Minerals (THERAPEUTIC MULTIVITAMIN-MINERALS) tablet Take 1 tablet by mouth daily. Time Spent on discharge: 33 min  in the examination, evaluation, counseling and review of medications and discharge plan. Signed:    Ivy Park MD   3/9/2023      Thank you Corwin Marcos MD for the opportunity to be involved in this patient's care. If you have any questions or concerns, please feel free to contact me at [phone].

## 2023-03-09 NOTE — PROGRESS NOTES
Physical Therapy  Facility/Department: [facility] SURG  Physical Therapy Initial Assessment    Name: Oswaldo Farley  : 1935  MRN: [id number]  Date of Service: 3/9/2023    Discharge Recommendations:  Home with assist PRN          Patient Diagnosis(es): The primary encounter diagnosis was Acute chest pain. A diagnosis of Elevated troponin was also pertinent to this visit. Past Medical History:  has a past medical history of Anesthesia complication, Arthritis, Cancer ([address] GERD (gastroesophageal reflux disease), Nausea & vomiting, Psoriasis, and Unspecified cerebral artery occlusion with cerebral infarction. Past Surgical History:  has a past surgical history that includes Skin cancer excision (Left, ); Hand surgery (Right); Nose surgery (4 years ago); Nasopharynx Exm Under Gen Anesth; and Bunionectomy (Right). Assessment   Body Structures, Functions, Activity Limitations Requiring Skilled Therapeutic Intervention: Decreased functional mobility   Assessment: Patient is an 82 y.o. female who has been having some intermittent cramping on the left side of her neck since May of last year when she had COVID, however it became constant after she tried lifting a mattress this past Saturday - pain now into left arm and chest. Prior to admission, pt living in condo setting, independent with ADLs and ambulation without device. Pt currently functioning slightly below baseline. Anticipate return home with PRN assist (reports dtr from [facility] coming into town and staying with her).   Treatment Diagnosis: impaired activity tolerance  Therapy Prognosis: Good  Decision Making: Medium Complexity  History: see above  Exam: see below  Clinical Presentation: evolving  Requires PT Follow-Up: Yes  Activity Tolerance  Activity Tolerance: Treatment limited secondary to medical complications  Activity Tolerance Comments: Limited due to dizziness     Plan   Physcial Therapy Plan  General Plan: 3-5 times per week  Current Treatment Recommendations: Strengthening, Balance training, Functional mobility training, Transfer training, Endurance training, Gait training, Neuromuscular re-education, Patient/Caregiver education & training, Safety education & training, Equipment evaluation, education, & procurement, Therapeutic activities  Safety Devices  Type of Devices: All fall risk precautions in place, Call light within reach, Gait belt, Patient at risk for falls, Left in chair, Chair alarm in place, Nurse notified     Restrictions  Restrictions/Precautions  Restrictions/Precautions: Fall Risk     Subjective   General  Chart Reviewed: Yes  Patient assessed for rehabilitation services?: Yes  Additional Pertinent Hx: Patient is an 82 y.o. female who has been having some intermittent cramping on the left side of her neck since May of last year when she had COVID, however it became constant after she tried lifting a mattress this past Saturday - pain now into left arm and chest.  Response To Previous Treatment: Not applicable  Family / Caregiver Present: No  Referring Practitioner: Michelle Wheatley MD  Referral Date : 03/08/23  Diagnosis: chest pain  Follows Commands: Within Functional Limits  Subjective  Subjective: Pt is agreeable to PT - reports chronic excedrin user for chronic migraines.          Social/Functional History  Social/Functional History  Lives With: Alone  Type of Home: Condo  Home Layout: Two level  Home Access: Level entry  Bathroom Shower/Tub: Tub/Shower unit, Walk-in shower (primaryily uses walk-in shower)  Bathroom Toilet: Standard  Home Equipment: Cane, Rollator  ADL Assistance: Independent  Homemaking Assistance: Independent  Ambulation Assistance: Independent  Transfer Assistance: Independent  Active : Yes  Mode of Transportation: Car  Occupation: Retired  Type of Occupation: CNA    Vision/Hearing  Vision  Vision: Within Functional Limits  Hearing  Hearing: Within functional limits      Cognition Orientation  Overall Orientation Status: Within Normal Limits  Cognition  Overall Cognitive Status: WNL     Objective   Gross Assessment  Strength: Within functional limits     Bed mobility  Supine to Sit: Unable to assess  Sit to Supine: Unable to assess  Bed Mobility Comments: Pt in BR at start of session, in recliner at end of session. Pt noted to be dizzy in bathroom. /102 at end of session. Transfers  Sit to Stand: Contact guard assistance  Stand to Sit: Contact guard assistance  Ambulation  Surface: Level tile  Device: Rolling Walker  Assistance: Contact guard assistance  Gait Deviations: Slow Aleena  Distance: 25'  Comments: Pt slightly guarded during ambulation - likely due to dizziness. She does report trying to force out BM while on toilet prior to therapy entering room. /102.      Balance  Posture: Good  Sitting - Static: Good  Sitting - Dynamic: Good  Standing - Static: Fair  Standing - Dynamic: Fair           AM-PAC Score  AM-PAC Inpatient Mobility Raw Score : 20 (03/09/23 0905)  AM-PAC Inpatient T-Scale Score : 47.67 (03/09/23 0905)  Mobility Inpatient CMS 0-100% Score: 35.83 (03/09/23 0905)  Mobility Inpatient CMS G-Code Modifier : CJ (03/09/23 1349)       Goals  Short Term Goals  Time Frame for Short Term Goals: by acute discharge  Short Term Goal 1: bed mobility SBA  Short Term Goal 2: sit<>stand SBA  Short Term Goal 3: ambulate > 48' with SBA  Patient Goals   Patient Goals : none stated       Education  Patient Education  Education Given To: Patient  Education Provided: Role of Therapy;Plan of Care  Education Method: Verbal  Barriers to Learning: None  Education Outcome: Verbalized understanding;Continued education needed      Therapy Time   Individual Concurrent Group Co-treatment   Time In 0830         Time Out 0900         Minutes 30         Timed Code Treatment Minutes: 15 Minutes       Jn Marr, PT

## 2023-03-09 NOTE — PLAN OF CARE
Problem: Discharge Planning  Goal: Discharge to home or other facility with appropriate resources  Outcome: Progressing  Flowsheets (Taken 3/9/2023 0800)  Discharge to home or other facility with appropriate resources: Identify barriers to discharge with patient and caregiver     Problem: Pain  Goal: Verbalizes/displays adequate comfort level or baseline comfort level  3/9/2023 1057 by Hu Coley RN  Outcome: Progressing  3/8/2023 2346 by Mary Holm RN  Outcome: Progressing     Problem: Safety - Adult  Goal: Free from fall injury  Outcome: Progressing     Problem: ABCDS Injury Assessment  Goal: Absence of physical injury  Outcome: Progressing

## 2023-03-09 NOTE — PROGRESS NOTES
[facility] 83  Cardiology Progress Note        CC:      Chest pain             HPI:   This is a 82 y.o. female who presents with chest pain. It appears to have started since Saturday when she was trying to lift a portion of her mattress. Since then she has been having increasing pain in both sides of her neck. It has now migrated down into her chest associated with left arm discomfort. She claims that anytime she exerts herself she gets the pain and the shortness of breath. She was sweaty on Sunday. Has hx of hyper tension but no cardiac disease. Patient had a stress test in December 2021 which was negative. Interrogation the patient states that she has been under a lot of stress. She takes Excedrin daily for migraines. She has been having cramp-like pains on the side of her neck going back to the head jaw. Lately has been going down her left arm and then now the right arm. She has had it across the chest as well. It appears to be present all the time but it is worse with activity or exertion. There is no precipitation with change in neck movements. No associated shortness of breath nausea vomiting.         Interval history  Still complaining about neck pain headache  Believes she has had a x-ray looking for nerve impingement and she may have a    Past Medical History:   Diagnosis Date    Anesthesia complication     states under general anesthesia has trouble awaking    Arthritis     fibramyalgia, osteo, DJD    Cancer ([facility] [address]     skin cancer removed from left arm    GERD (gastroesophageal reflux disease)     Nausea & vomiting     Psoriasis     scalp, front of legs and back of arms    Unspecified cerebral artery occlusion with cerebral infarction 5 years ago    several TIA      Past Surgical History:   Procedure Laterality Date    BUNIONECTOMY Right     EXAM UNDER GENERAL ANESTHESIA OF NASOPHARYNX      HAND SURGERY Right     related to arthritis    NOSE SURGERY  4 years ago    1) nasal fracture and 2nd) fungal infection removed    SKIN CANCER EXCISION Left 2014      Family History   Problem Relation Age of Onset    Asthma Mother     Heart Disease Mother     Cancer Mother     Heart Disease Father     Early Death Brother     Cancer Brother         esophogeal cancer    Asthma Brother     Heart Disease Brother       Social History     Tobacco Use    Smoking status: Former    Smokeless tobacco: Never    Tobacco comments:     smokes as a teenager quit when she was 23years old   Vaping Use    Vaping Use: Never used   Substance Use Topics    Alcohol use: No    Drug use: No      Allergies   Allergen Reactions    Sulfa Antibiotics Hives and Other (See Comments)     Mouth sores  Other reaction(s): Unknown  Sore tongue    Azithromycin Rash    Hydrocodone-Acetaminophen Nausea And Vomiting    Morphine Nausea And Vomiting     hallucination    Nitrofurantoin Rash      atorvastatin  20 mg Oral Nightly    sodium chloride flush  5-40 mL IntraVENous 2 times per day    aspirin  81 mg Oral Daily    [Held by provider] enoxaparin  40 mg SubCUTAneous Daily    docusate sodium  100 mg Oral QAM    lisinopril  20 mg Oral Nightly    mometasone-formoterol  2 puff Inhalation BID    pantoprazole  40 mg Oral QAM AC       Review of Systems -   Constitutional: Negative for weight gain/loss; malaise, fever  Respiratory: Negative for Asthma;  cough and hemoptysis  Cardiovascular: Negative for palpitations,dizziness   Gastrointestinal: Negative for abd.pain; constipation/diarrhea;    Genitourinary: Negative for stones; hematuria; frequency hesitancy  Integumentt: Negative for rash or pruritis  Hematologic/lymphatic: Negative for blood dyscrasia; leukemia/lymphoma  Musculoskeletal: Negative for Connective tissue disease  Neurological:  Negative for Seizure   Behavioral/Psych:Negative for Bipolar disorder, Schizophrenia; Dementia  Endocrine: negative for thyroid, parathyroid disease      Intake/Output Summary (Last 24 hours) at 3/9/2023 1340  Last data filed at 3/9/2023 0835  Gross per 24 hour   Intake 664 ml   Output --   Net 664 ml       Physical Examination:  BP (!) 148/76   Pulse 63   Temp 97.4 °F (36.3 °C) (Oral)   Resp 18   Ht 4' 11\" (1.499 m)   Wt 143 lb 4.8 oz (65 kg)   SpO2 95%   BMI 28.94 kg/m²    HEENT:  Face: Atraumatic, Conjunctiva: Pink; non icteric,  Mucous Memb:  Moist, No thyromegaly or Lymphadenopathy  Respiratory:  Resp Assessment: normal, Resp Auscultation: clear   Cardiovascular: Auscultation: nl S1 & S2, Palpation:  Nl PMI;  No heaves or thrills, JVP:  normal  Abdomen: Soft, non-tender, Normal bowel sounds,  No organomegaly  Extremities: No Cyanosis or Clubbing; Edema none  Neurological: Oriented to time, place, and person, Non-anxious  Psychiatric: Normal mood and affect  Skin: Warm and dry,  No rash seen      Current Facility-Administered Medications: atorvastatin (LIPITOR) tablet 20 mg, 20 mg, Oral, Nightly  morphine (PF) injection 2 mg, 2 mg, IntraVENous, Q4H PRN  sodium chloride flush 0.9 % injection 5-40 mL, 5-40 mL, IntraVENous, 2 times per day  sodium chloride flush 0.9 % injection 5-40 mL, 5-40 mL, IntraVENous, PRN  0.9 % sodium chloride infusion, , IntraVENous, PRN  ondansetron (ZOFRAN-ODT) disintegrating tablet 4 mg, 4 mg, Oral, Q8H PRN **OR** ondansetron (ZOFRAN) injection 4 mg, 4 mg, IntraVENous, Q6H PRN  acetaminophen (TYLENOL) tablet 650 mg, 650 mg, Oral, Q6H PRN **OR** acetaminophen (TYLENOL) suppository 650 mg, 650 mg, Rectal, Q6H PRN  polyethylene glycol (GLYCOLAX) packet 17 g, 17 g, Oral, Daily PRN  aspirin chewable tablet 81 mg, 81 mg, Oral, Daily  [Held by provider] enoxaparin (LOVENOX) injection 40 mg, 40 mg, SubCUTAneous, Daily  albuterol sulfate HFA (PROVENTIL;VENTOLIN;PROAIR) 108 (90 Base) MCG/ACT inhaler 2 puff, 2 puff, Inhalation, Q6H PRN  docusate sodium (COLACE) capsule 100 mg, 100 mg, Oral, QAM  lisinopril (PRINIVIL;ZESTRIL) tablet 20 mg, 20 mg, Oral, Nightly  LORazepam (ATIVAN) tablet 0.5 mg, 0.5 mg, Oral, Q6H PRN  mometasone-formoterol (DULERA) 200-5 MCG/ACT inhaler 2 puff, 2 puff, Inhalation, BID  zolpidem (AMBIEN) tablet 5 mg, 5 mg, Oral, Nightly PRN  perflutren lipid microspheres (DEFINITY) injection 1.5 mL, 1.5 mL, IntraVENous, ONCE PRN  pantoprazole (PROTONIX) tablet 40 mg, 40 mg, Oral, QAM AC      Labs:   Recent Labs     03/07/23  1319 03/08/23  0653   WBC 5.7 4.4   HGB 12.5 12.5   HCT 37.2 37.3    162     Recent Labs     03/07/23  1319 03/09/23  0639    135*   K 4.7 4.0   CO2 23 23   BUN 14 13   CREATININE 0.9 0.9   GLUCOSE 94 88     No results for input(s): BNP in the last 72 hours. No results for input(s): PROTIME, INR in the last 72 hours. No results for input(s): APTT in the last 72 hours. Recent Labs     03/07/23  1945 03/08/23  0205 03/08/23  0653   TROPONINI 0.02* <0.01 <0.01     Lab Results   Component Value Date/Time    HDL 62 12/10/2021 06:27 AM    LDLCALC 130 12/10/2021 06:27 AM    TRIG 99 12/10/2021 06:27 AM     Recent Labs     03/07/23  1319   AST 16   ALT 9*   LABALBU 4.0         EKG:   Ectopic atrial beats no ischemic ST changes    Chest X-Ray:  Basilar atelectasis    ECHO:3/07/2023   Normal LV size and wall motion. EF is  60%. Indeterminate diastolic  function. The left atrium is normal in size. Normal right ventricular size and function. Mild Mitral and Tricuspid regurgitation. SPAP is estimated at 44 mmHg consistent  with mild pulmonary hypertension (RA pressure 3 mmHg). Stress Nuclear: 12/2021  1. Technically a satisfactory study. 2. No evidence of Ischemia by Myocardial Perfusion Imaging. 3. Gated Study shows normal LV size and Systolic function; EF is 69 %. ASSESSMENT AND PLAN:      Chest pain atypical for angina  She describes bilateral neck pain going back of the ears to her head and to the front as well  Present constantly,  but worse with activity or exertion. States she was having pain as she and I were talking.   First 2 troponins were elevated at 0.2 is very borderline. The next one is 0.01. EKG showed no ischemic ST changes     Patient had studies done which suggests C5-C7 spondylosis  May have cervical radiculopathy  I do not think her pain is cardiac    We will sign off.   Thank you for the consultation    Selvin Pena M.D  3/9/2023

## 2023-03-22 NOTE — PROGRESS NOTES
Covid testing to be done: If positive---Pt instructed to notify MD ASAP  If negative--pt was instructed to bring Hard copy of Covid results DOP/DOS  DOS. C-Difficile admission screening and protocol:       * Admitted with diarrhea? [] YES    [x]  NO     *Prior history of C-Diff. In last 3 months? [] YES    [x]  NO     *Antibiotic use in the past 6-8 weeks? []  NO    [x]  YES      If yes, which: REASON_________________     *Prior hospitalization or nursing home in the last month? [x]  YES    []  NO     SAFETY FIRST. .call before you fall    [facility] time__1045 __3/24/23___Surgery time__1215______    Do not eat or drink anything after 12:00 midnight prior to your surgery. This includes water chewing gum, mints and ice chips- the Day of Surgery. You may brush your teeth and gargle the morning of your surgery, but do not swallow the water     Please see your family doctor/pediatrician for a history and physical and/or questions concerning medications. Bring any test results/reports from your physicians office. If you are under the care of a heart doctor or specialist doctor, please be aware that you may be asked to them for clearance    You may be asked to stop blood thinners such as Coumadin, Plavix, Fragmin, Lovenox, etc., or any anti-inflammatories such as:  Aspirin, Ibuprofen, Advil, Naproxen prior to your surgery. We also ask that you stop any OTC medications such as fish oil, vitamin E, glucosamine, garlic, Multivitamins, COQ 10, etc.    We ask that you do not smoke 24 hours prior to surgery  We ask that you do not  drink any alcoholic beverages 24 hours prior to surgery     You must make arrangements for a responsible adult to take you home after your surgery. For your safety you will not be allowed to leave alone or drive yourself home. Your surgery will be cancelled if you do not have a ride home.      Also for your Please contact your surgeon office, if you have any further questions. [facility] phone number:  [address] [facility] fax number:  [phone]    Please note these are generalized instructions for all surgical cases, you may be provided with more specific instructions according to your surgery.

## 2023-03-23 ENCOUNTER — ANESTHESIA EVENT (OUTPATIENT)
Dept: ENDOSCOPY | Age: 88
End: 2023-03-23
Payer: MEDICARE

## 2023-03-24 ENCOUNTER — HOSPITAL ENCOUNTER (OUTPATIENT)
Age: 88
Setting detail: OUTPATIENT SURGERY
Discharge: HOME | End: 2023-03-24
Attending: INTERNAL MEDICINE | Admitting: INTERNAL MEDICINE
Payer: MEDICARE

## 2023-03-24 ENCOUNTER — ANESTHESIA (OUTPATIENT)
Dept: ENDOSCOPY | Age: 88
End: 2023-03-24
Payer: MEDICARE

## 2023-03-24 VITALS
SYSTOLIC BLOOD PRESSURE: 179 MMHG | HEIGHT: 59 IN | TEMPERATURE: 98.3 F | HEART RATE: 57 BPM | RESPIRATION RATE: 18 BRPM | WEIGHT: 136.24 LBS | OXYGEN SATURATION: 97 % | BODY MASS INDEX: 27.47 KG/M2 | DIASTOLIC BLOOD PRESSURE: 90 MMHG

## 2023-03-24 DIAGNOSIS — R19.8 RECTAL DISCHARGE: ICD-10-CM

## 2023-03-24 DIAGNOSIS — R13.10 DYSPHAGIA, UNSPECIFIED TYPE: ICD-10-CM

## 2023-03-24 PROCEDURE — 3609008900 HC SIGMOIDOSCOPY POLYP SNARE: Performed by: INTERNAL MEDICINE

## 2023-03-24 PROCEDURE — 7100000001 HC PACU RECOVERY - ADDTL 15 MIN: Performed by: INTERNAL MEDICINE

## 2023-03-24 PROCEDURE — 2709999900 HC NON-CHARGEABLE SUPPLY: Performed by: INTERNAL MEDICINE

## 2023-03-24 PROCEDURE — 3700000001 HC ADD 15 MINUTES (ANESTHESIA): Performed by: INTERNAL MEDICINE

## 2023-03-24 PROCEDURE — 2580000003 HC RX 258: Performed by: NURSE ANESTHETIST, CERTIFIED REGISTERED

## 2023-03-24 PROCEDURE — 2500000003 HC RX 250 WO HCPCS: Performed by: NURSE ANESTHETIST, CERTIFIED REGISTERED

## 2023-03-24 PROCEDURE — 7100000010 HC PHASE II RECOVERY - FIRST 15 MIN: Performed by: INTERNAL MEDICINE

## 2023-03-24 PROCEDURE — 6360000002 HC RX W HCPCS: Performed by: NURSE ANESTHETIST, CERTIFIED REGISTERED

## 2023-03-24 PROCEDURE — 2580000003 HC RX 258: Performed by: ANESTHESIOLOGY

## 2023-03-24 PROCEDURE — 3700000000 HC ANESTHESIA ATTENDED CARE: Performed by: INTERNAL MEDICINE

## 2023-03-24 PROCEDURE — 7100000000 HC PACU RECOVERY - FIRST 15 MIN: Performed by: INTERNAL MEDICINE

## 2023-03-24 PROCEDURE — 88305 TISSUE EXAM BY PATHOLOGIST: CPT

## 2023-03-24 PROCEDURE — 7100000011 HC PHASE II RECOVERY - ADDTL 15 MIN: Performed by: INTERNAL MEDICINE

## 2023-03-24 PROCEDURE — 3609015300 HC ESOPHAGEAL DILATION MALONEY: Performed by: INTERNAL MEDICINE

## 2023-03-24 RX ORDER — PROPOFOL 10 MG/ML
INJECTION, EMULSION INTRAVENOUS PRN
Status: DISCONTINUED | OUTPATIENT
Start: 2023-03-24 | End: 2023-03-24 | Stop reason: SDUPTHER

## 2023-03-24 RX ORDER — PROPOFOL 10 MG/ML
INJECTION, EMULSION INTRAVENOUS CONTINUOUS PRN
Status: DISCONTINUED | OUTPATIENT
Start: 2023-03-24 | End: 2023-03-24 | Stop reason: SDUPTHER

## 2023-03-24 RX ORDER — SODIUM CHLORIDE 9 MG/ML
INJECTION, SOLUTION INTRAVENOUS PRN
Status: DISCONTINUED | OUTPATIENT
Start: 2023-03-24 | End: 2023-03-24 | Stop reason: HOSPADM

## 2023-03-24 RX ORDER — SODIUM CHLORIDE 0.9 % (FLUSH) 0.9 %
5-40 SYRINGE (ML) INJECTION EVERY 12 HOURS SCHEDULED
Status: DISCONTINUED | OUTPATIENT
Start: 2023-03-24 | End: 2023-03-24 | Stop reason: HOSPADM

## 2023-03-24 RX ORDER — LIDOCAINE HYDROCHLORIDE 20 MG/ML
INJECTION, SOLUTION EPIDURAL; INFILTRATION; INTRACAUDAL; PERINEURAL PRN
Status: DISCONTINUED | OUTPATIENT
Start: 2023-03-24 | End: 2023-03-24 | Stop reason: SDUPTHER

## 2023-03-24 RX ORDER — SODIUM CHLORIDE 9 MG/ML
INJECTION, SOLUTION INTRAVENOUS CONTINUOUS PRN
Status: DISCONTINUED | OUTPATIENT
Start: 2023-03-24 | End: 2023-03-24 | Stop reason: SDUPTHER

## 2023-03-24 RX ORDER — SODIUM CHLORIDE 0.9 % (FLUSH) 0.9 %
5-40 SYRINGE (ML) INJECTION PRN
Status: DISCONTINUED | OUTPATIENT
Start: 2023-03-24 | End: 2023-03-24 | Stop reason: HOSPADM

## 2023-03-24 RX ADMIN — PROPOFOL 180 MCG/KG/MIN: 10 INJECTION, EMULSION INTRAVENOUS at 12:09

## 2023-03-24 RX ADMIN — SODIUM CHLORIDE: 9 INJECTION, SOLUTION INTRAVENOUS at 12:03

## 2023-03-24 RX ADMIN — SODIUM CHLORIDE: 9 INJECTION, SOLUTION INTRAVENOUS at 11:11

## 2023-03-24 RX ADMIN — LIDOCAINE HYDROCHLORIDE 60 MG: 20 INJECTION, SOLUTION EPIDURAL; INFILTRATION; INTRACAUDAL; PERINEURAL at 12:09

## 2023-03-24 RX ADMIN — PROPOFOL 70 MG: 10 INJECTION, EMULSION INTRAVENOUS at 12:09

## 2023-03-24 ASSESSMENT — LIFESTYLE VARIABLES: SMOKING_STATUS: 0

## 2023-03-24 ASSESSMENT — PAIN SCALES - GENERAL
PAINLEVEL_OUTOF10: 0
PAINLEVEL_OUTOF10: 0

## 2023-03-24 ASSESSMENT — PAIN - FUNCTIONAL ASSESSMENT: PAIN_FUNCTIONAL_ASSESSMENT: 0-10

## 2023-03-24 NOTE — ANESTHESIA POSTPROCEDURE EVALUATION
Department of Anesthesiology  Postprocedure Note    Patient: Heather Hernandez  MRN: [id number]  YOB: 1935  Date of evaluation: 3/24/2023      Procedure Summary     Date: 03/24/23 Room / Location: [address] [place] [address]    Anesthesia Start: 6839 Anesthesia Stop: 1226    Procedures:       ESOPHAGEAL DILATION [address] SNARE Diagnosis:       Rectal discharge      Dysphagia, unspecified type      (RECTAL DISCHARGE, DYSPHAGIA)    Surgeons: Chas Perez MD Responsible Provider: Carlos Lam MD    Anesthesia Type: MAC ASA Status: 3          Anesthesia Type: No value filed.     Darian Phase I: Darian Score: 8    Darian Phase II:        Anesthesia Post Evaluation    Patient location during evaluation: PACU  Patient participation: complete - patient participated  Level of consciousness: awake and alert  Pain score: 0  Airway patency: patent  Nausea & Vomiting: no nausea and no vomiting  Complications: no  Cardiovascular status: blood pressure returned to baseline  Respiratory status: acceptable  Hydration status: euvolemic

## 2023-03-24 NOTE — PROGRESS NOTES
Patient admitted to PACU # 12 from OR at 1231 post esophageal dilation magalis per MD Pat Barton. Attached to PACU monitoring system and report received from anesthesia provider. Patient was reported to be hemodynamically stable during procedure. Patient drowsy on admission and denied pain.

## 2023-03-24 NOTE — OP NOTE
procedure well and was taken to the recovery area in good condition. Findings of upper endoscopy: The mucosa in the esophagus is normal.  The squamocolumnar junction is normal.  There was no evidence of active esophagitis. The gastric mucosa is normal.  There was no evidence of ulcer disease. The mucosa in the duodenal bulb and descending duodenum is normal.  At the end of procedure, a [address] [employer] was passed to empirically dilate the esophagus. The scope was reintroduced. A shallow longitudinal tear was present in the proximal esophagus post dilation. Findings of flexible sigmoidoscopy: Perianal examination reveals protruding thickened indurated skin circumferentially around the anal orifice. The retroflexed view was normal.  There was no evidence of significant internal hemorrhoids. In the sigmoid colon, a 4 mm polyp was removed with a cold snare. Moderately severe diverticular disease was noted in the sigmoid colon. Recommendations: Call in 1 week for pathology findings. Referral to colorectal surgery for evaluation of the protruding indurated skin around the anal orifice.     Saul He MD, MD

## 2023-03-24 NOTE — ANESTHESIA PRE PROCEDURE
Signs Statistics (for past 48 hrs)     Temp  Av.8 °F (36.6 °C)  Min: 97.8 °F (36.6 °C)   Min taken time: 23 105  Max: 97.8 °F (36.6 °C)   Max taken time: 23 105  Pulse  Av  Min: 73   Min taken time: 23 105  Max: 80   Max taken time: 23 1057  Resp  Av  Min: 25   Min taken time: 23 105  Max: 18   Max taken time: 23 105  BP  Min: 146/94   Min taken time: 23 105  Max: 157/115   Max taken time: 23 105  SpO2  Av %  Min: 97 %   Min taken time: 23 105  Max: 97 %   Max taken time: 23 105  BP Readings from Last 3 Encounters:   23 (!) 146/94   23 (!) 148/76   22 (!) 148/83       BMI  Body mass index is 27.52 kg/m². Estimated body mass index is 27.52 kg/m² as calculated from the following:    Height as of this encounter: 4' 11\" (1.499 m). Weight as of this encounter: 136 lb 3.9 oz (61.8 kg).     CBC   Lab Results   Component Value Date/Time    WBC 4.4 2023 06:53 AM    RBC 4.34 2023 06:53 AM    HGB 12.5 2023 06:53 AM    HCT 37.3 2023 06:53 AM    MCV 85.9 2023 06:53 AM    RDW 13.6 2023 06:53 AM     2023 06:53 AM     CMP    Lab Results   Component Value Date/Time     2023 06:39 AM    K 4.0 2023 06:39 AM    K 4.7 2023 01:19 PM     2023 06:39 AM    CO2 23 2023 06:39 AM    BUN 13 2023 06:39 AM    CREATININE 0.9 2023 06:39 AM    GFRAA >60 12/10/2021 06:27 AM    GFRAA >60 2011 08:25 AM    AGRATIO 1.4 2023 01:19 PM    LABGLOM >60 2023 06:39 AM    GLUCOSE 88 2023 06:39 AM    PROT 6.9 2023 01:19 PM    PROT 7.1 2011 08:25 AM    CALCIUM 8.9 2023 06:39 AM    BILITOT 0.3 2023 01:19 PM    ALKPHOS 80 2023 01:19 PM    AST 16 2023 01:19 PM    ALT 9 2023 01:19 PM     BMP    Lab Results   Component Value Date/Time     2023 06:39 AM    K 4.0 2023 06:39 AM

## 2023-03-24 NOTE — H&P
[facility]   Attending Pre-operative History and Physical      PROCEDURE:  upper endoscopy and flexible sigmoidoscopy. Indication:The patient is a 82 y.o. female presents for an EGD and Flexible sigmoidoscopy. Past Medical History:    Past Medical History:   Diagnosis Date    Anesthesia complication     states under general anesthesia has trouble awaking    Arthritis     fibramyalgia, osteo, DJD    Cancer ([address]     skin cancer removed from left arm, back and right ear    GERD (gastroesophageal reflux disease)     Kidney stone     Nausea & vomiting     Prolonged emergence from general anesthesia     Psoriasis     scalp, front of legs and back of arms    Unspecified cerebral artery occlusion with cerebral infarction 5 years ago    several TIA      Past Surgical History:    Past Surgical History:   Procedure Laterality Date    BUNIONECTOMY Right     EXAM UNDER GENERAL ANESTHESIA OF NASOPHARYNX      HAND SURGERY Right     related to arthritis    NOSE SURGERY  4 years ago    1) nasal fracture and 2nd) fungal infection removed    SKIN CANCER EXCISION Left 2014      Medications Prior to Admission:   Prior to Admission medications    Medication Sig Start Date End Date Taking? Authorizing Provider   Aspirin-Acetaminophen-Caffeine (EXCEDRIN PO) Take by mouth Daily   Yes Historical Provider, MD   LORazepam (ATIVAN) 0.5 MG tablet Take 0.5 mg by mouth every 6 hours as needed for Anxiety. Historical Provider, MD   mometasone-formoterol [facility]) 200-5 MCG/ACT inhaler Inhale 2 puffs into the lungs every 12 hours  Patient not taking: Reported on 3/24/2023    Historical Provider, MD   docusate sodium (COLACE) 100 MG capsule Take 100 mg by mouth every morning 3 pills everynight    Historical Provider, MD   lisinopril (PRINIVIL;ZESTRIL) 10 MG tablet Take 10 mg by mouth every morning    Historical Provider, MD   zolpidem (AMBIEN) 5 MG tablet Take 5 mg by mouth nightly as needed for Sleep.     Historical Provider, MD

## 2023-03-24 NOTE — DISCHARGE INSTRUCTIONS
Endoscopy Discharge Instructions    Call [facility]@ with any questions or concerns. You may be drowsy or lightheaded after receiving sedation. DO NOT operate  a vehicle (automobile, bicycle, motorcycle, machinery, or power tools), no  alcoholic beverages, and do not make any important decisions today. Plan on bed rest or quiet relaxation today. Resume normal activities in the morning. Resume normal activity tomorrow unless otherwise advised by your physician. Eat a light first meal, avoiding spicy and fatty foods, then resume normal diet unless  you are told otherwise by your physician. If the intravenous medication site is painful, apply warm compresses on the site until the soreness is relieved and elevate the arm above the heart. Call your physician if no improvement  in 2-3 days. POSSIBLE SYMPTOMS TO WATCH:     1. fever (greater than 100) 5. increased abdominal bloating   2. severe pain   6. excessive bleeding   3. nausea and vomiting  7. chest pain   4. chills    8. shortness of breath       [facility] if these problems occur     Expected as normal and remedies:  Sore throat: use over the counter throat lozenges or gargle with warm salt water. Redness or soreness at the IV site: apply warm compress  Gaseous discomfort: belching or passing flatus (gas). Call in 1 week for pathology findings. Rebel Rodrigues MD    With questions or concerns call Dr Jared Olmstead at .

## 2023-03-24 NOTE — PROGRESS NOTES
PACU Transfer to [facility]    Vitals:    03/24/23 1245   BP: (!) 170/86   Pulse: 56   Resp: 15   Temp:    SpO2: 98%         Intake/Output Summary (Last 24 hours) at 3/24/2023 1248  Last data filed at 3/24/2023 1226  Gross per 24 hour   Intake 200 ml   Output --   Net 200 ml       Pain assessment:  none  Pain Level: 0    Patient transferred to care of [facility] RN.    3/24/2023 12:48 PM

## 2023-03-24 NOTE — PROGRESS NOTES
Pt discharged in stable condition at this time. Discharge instruction to pt and friend. Dr Raman Cheng into talk to pt before discharge and referral ph no for dr Ana Calderon on DI written - verbalized understanding. pt offers no c/o pain or nausea and tolerated juice well. Iv dc d with site wnl.

## 2023-04-21 ENCOUNTER — OFFICE VISIT (OUTPATIENT)
Dept: SURGERY | Age: 88
End: 2023-04-21
Payer: MEDICARE

## 2023-04-21 VITALS
OXYGEN SATURATION: 98 % | SYSTOLIC BLOOD PRESSURE: 152 MMHG | DIASTOLIC BLOOD PRESSURE: 86 MMHG | HEIGHT: 59 IN | BODY MASS INDEX: 26.81 KG/M2 | HEART RATE: 57 BPM | WEIGHT: 133 LBS | TEMPERATURE: 97.4 F

## 2023-04-21 DIAGNOSIS — K62.89 ANAL IRRITATION: Primary | ICD-10-CM

## 2023-04-21 PROCEDURE — 99204 OFFICE O/P NEW MOD 45 MIN: CPT | Performed by: SURGERY

## 2023-04-21 PROCEDURE — 1123F ACP DISCUSS/DSCN MKR DOCD: CPT | Performed by: SURGERY

## 2023-04-21 PROCEDURE — G8417 CALC BMI ABV UP PARAM F/U: HCPCS | Performed by: SURGERY

## 2023-04-21 PROCEDURE — 1090F PRES/ABSN URINE INCON ASSESS: CPT | Performed by: SURGERY

## 2023-04-21 PROCEDURE — G8427 DOCREV CUR MEDS BY ELIG CLIN: HCPCS | Performed by: SURGERY

## 2023-04-21 PROCEDURE — 1036F TOBACCO NON-USER: CPT | Performed by: SURGERY

## 2023-04-21 RX ORDER — CLOBETASOL PROPIONATE 0.5 MG/G
OINTMENT TOPICAL
Qty: 15 G | Refills: 0 | Status: SHIPPED | OUTPATIENT
Start: 2023-04-21

## 2023-04-21 NOTE — PROGRESS NOTES
[facility] [place] [facility] RECTAL SURGERY  [address] [facility] [address] [facility] [address]  [address] [address]  Dept: [phone]  Dept Fax: [phone]: [phone]    Visit Date: 4/21/2023    Damion Chu is a 82 y.o. female who presents today for: New Patient (Skin tags )      HPI:       Damion Chu is a 82 y.o. female by Dr. Thiago Law normal recently underwent colonoscopy and she was found to have skin tags near the end of her anus. of GI for further evaluation guarding anal skin tags. She tells me she has been having issues with itching, irritation. Patient's problem list, medications, past medical, surgical, family, and social histories were reviewed and updated in the chart as indicated today. Past Medical History:   Diagnosis Date    Anesthesia complication     states under general anesthesia has trouble awaking    Arthritis     fibramyalgia, osteo, DJD    Cancer ([address]     skin cancer removed from left arm, back and right ear    GERD (gastroesophageal reflux disease)     Kidney stone     Nausea & vomiting     Prolonged emergence from general anesthesia     Psoriasis     scalp, front of legs and back of arms    Unspecified cerebral artery occlusion with cerebral infarction 5 years ago    several TIA       Past Surgical History:   Procedure Laterality Date    BUNIONECTOMY Right     ESOPHAGEAL DILATATION N/A 3/24/2023    ESOPHAGEAL DILATION KELLER performed by Jonah Davis MD at [facility] [address] Right     related to arthritis    NOSE SURGERY  4 years ago    1) nasal fracture and 2nd) fungal infection removed    SIGMOIDOSCOPY N/A 3/24/2023    SIGMOIDOSCOPY POLYP SNARE performed by Jonah Davis MD at [address] Left 2014       Cancer-related family history includes Cancer in her brother and mother.     Social History:

## 2024-01-12 ENCOUNTER — APPOINTMENT (OUTPATIENT)
Dept: GENERAL RADIOLOGY | Age: 89
End: 2024-01-12
Payer: MEDICARE

## 2024-01-12 ENCOUNTER — HOSPITAL ENCOUNTER (EMERGENCY)
Age: 89
Discharge: HOME | End: 2024-01-12
Payer: MEDICARE

## 2024-01-12 VITALS
DIASTOLIC BLOOD PRESSURE: 85 MMHG | WEIGHT: 138 LBS | OXYGEN SATURATION: 97 % | RESPIRATION RATE: 14 BRPM | TEMPERATURE: 97.6 F | HEART RATE: 89 BPM | BODY MASS INDEX: 27.87 KG/M2 | SYSTOLIC BLOOD PRESSURE: 123 MMHG

## 2024-01-12 DIAGNOSIS — L03.116 CELLULITIS OF LEFT FOOT: Primary | ICD-10-CM

## 2024-01-12 DIAGNOSIS — R05.9 COUGH, UNSPECIFIED TYPE: ICD-10-CM

## 2024-01-12 PROCEDURE — 99284 EMERGENCY DEPT VISIT MOD MDM: CPT

## 2024-01-12 PROCEDURE — 93925 LOWER EXTREMITY STUDY: CPT

## 2024-01-12 PROCEDURE — 93971 EXTREMITY STUDY: CPT

## 2024-01-12 PROCEDURE — 71045 X-RAY EXAM CHEST 1 VIEW: CPT

## 2024-01-12 ASSESSMENT — PAIN SCALES - GENERAL
PAINLEVEL_OUTOF10: 8
PAINLEVEL_OUTOF10: 8

## 2024-01-12 ASSESSMENT — PAIN - FUNCTIONAL ASSESSMENT
PAIN_FUNCTIONAL_ASSESSMENT: 0-10
PAIN_FUNCTIONAL_ASSESSMENT: 0-10

## 2024-01-12 NOTE — ED NOTES
.Pt discharged at this time. Discharge instructions and medications reviewed,  Questions were answered. PT verbalized understanding.  VSS, Afebrile.  Follow up appointments were discussed.

## 2024-01-12 NOTE — DISCHARGE INSTRUCTIONS
Your chest x-ray did not show any sign of pneumonia.  Your family doctor started you on an antibiotic this will cover for cellulitis.  I recommend that you follow-up with your family doctor Monday or Tuesday for wound check, return immediately to the ER if you develop new or worsening symptoms such as fever worsening redness worsening pain.

## 2024-01-13 NOTE — ED PROVIDER NOTES
[facility] EMERGENCY DEPARTMENT  EMERGENCY DEPARTMENT ENCOUNTER        Pt Name: Joellen Whittington  MRN: [id number]  Birthdate 1935  Date of evaluation: 1/12/2024  Provider: AMARIS Noland  PCP: Amaury Fortune MD  Note Started: 7:56 PM EST 1/12/24      SANDI. I have evaluated this patient.        CHIEF COMPLAINT       Chief Complaint   Patient presents with    Foot Pain     PCP sent in for concern of DVT in right foot.       HISTORY OF PRESENT ILLNESS: 1 or more Elements     History from : Patient    Limitations to history : None    Joellen Whittington is a 88 y.o. female with past medical history of arthritis who presents from referral from her podiatrist for concern for DVT.  Patient notes that she has been dealing with bilateral lower extremity peripheral neuropathy for quite some time but for the last 2 days she has had worsening pain and swelling to both feet and ankles and into the lower legs.  She notes redness to the left foot that started around the same time.  She denies any recent falls or injuries.  She does note that she has had a cough for the last couple days she was seen by her family doctor diagnosed with bronchitis, they called in an oral antibiotic, Ceftin she has not started it yet.  She was seen by podiatry earlier today who was concerned for potential DVT and she was referred to the ER for imaging.  She denies chest pain or significant shortness of breath.  She does not take any blood thinners.  She has no other acute concerns or complaints at this time.      Nursing Notes were all reviewed and agreed with or any disagreements were addressed in the HPI.    REVIEW OF SYSTEMS :      Review of Systems    Positives and Pertinent negatives as per HPI.     SURGICAL HISTORY     Past Surgical History:   Procedure Laterality Date    BUNIONECTOMY Right     ESOPHAGEAL DILATATION N/A 3/24/2023    ESOPHAGEAL DILATION KELLER performed by Yemi Salinas MD at [facility]

## 2024-01-15 NOTE — CONSULTS
Interventional Cardiology Consultation     Joellen Beltreterewilton  1935    PCP: Amaury Fortune MD  Referring Physician: ***  Reason for Referral: ***  Chief Complaint:   Chief Complaint   Patient presents with    Foot Pain     PCP sent in for concern of DVT in right foot.       Subjective:   {Location/Quality/Severity/Duration/Timing/Modifying factors/Associated signs and symptoms:[id number]}  History of Present Illness: The patient is 88 y.o. female with a past medical history significant for *** who presents with ***          Past Medical History:   Diagnosis Date    Anesthesia complication     states under general anesthesia has trouble awaking    Arthritis     fibramyalgia, osteo, DJD    Cancer (HCC)     skin cancer removed from left arm, back and right ear    GERD (gastroesophageal reflux disease)     Kidney stone     Nausea & vomiting     Prolonged emergence from general anesthesia     Psoriasis     scalp, front of legs and back of arms    Unspecified cerebral artery occlusion with cerebral infarction 5 years ago    several TIA     Past Surgical History:   Procedure Laterality Date    BUNIONECTOMY Right     ESOPHAGEAL DILATATION N/A 3/24/2023    ESOPHAGEAL DILATION KELLER performed by Yemi Salinas MD at [facility] ENDOSCOPY    EXAM UNDER GENERAL ANESTHESIA OF NASOPHARYNX      HAND SURGERY Right     related to arthritis    NOSE SURGERY  4 years ago    1) nasal fracture and 2nd) fungal infection removed    SIGMOIDOSCOPY N/A 3/24/2023    SIGMOIDOSCOPY POLYP SNARE performed by Yemi Salinas MD at [facility] ENDOSCOPY    SKIN CANCER EXCISION Left 2014     Family History   Problem Relation Age of Onset    Asthma Mother     Heart Disease Mother     Cancer Mother     Heart Disease Father     Early Death Brother     Cancer Brother         esophogeal cancer    Asthma Brother     Heart Disease Brother      Social History     Tobacco Use    Smoking status: Former    Smokeless tobacco: Never

## 2024-08-26 RX ORDER — POLYETHYLENE GLYCOL 3350 17 G/17G
POWDER, FOR SOLUTION ORAL
COMMUNITY

## 2024-08-26 RX ORDER — LOSARTAN POTASSIUM AND HYDROCHLOROTHIAZIDE 12.5; 1 MG/1; MG/1
0.5 TABLET ORAL 2 TIMES DAILY
COMMUNITY
Start: 2024-07-31

## 2024-08-26 NOTE — PROGRESS NOTES
[facility] PRE-OPERATIVE INSTRUCTIONS    Day of Procedure:  9/6/2024                Arrival time:  1045                Surgery time: 1215    Take the following medications with a sip of water:  Follow your MD/Surgeons pre-procedure instructions regarding your medications     Do not eat or drink anything after 12:00 midnight prior to your surgery.  This includes water chewing gum, mints and ice chips.   You may brush your teeth and gargle the morning of your surgery, but do not swallow the water     Please see your family doctor/pediatrician for a history and physical and/or concerning medications.   Bring any test results/reports from your physicians office.   If you are under the care of a heart doctor or specialist doctor, please be aware that you may be asked to them for clearance    You may be asked to stop blood thinners such as Coumadin, Plavix, Fragmin, Lovenox, etc., or any anti-inflammatories such as:  Aspirin, Ibuprofen, Advil, Naproxen prior to your surgery.    We also ask that you stop any OTC medications such as fish oil, vitamin E, glucosamine, garlic, Multivitamins, COQ 10, etc.    We ask that you do not smoke 24 hours prior to surgery  We ask that you do not  drink any alcoholic beverages 24 hours prior to surgery     You must make arrangements for a responsible adult to take you home after your surgery.    For your safety you will not be allowed to leave alone or drive yourself home.  Your surgery will be cancelled if you do not have a ride home.     Also for your safety, you must have someone stay with you the first 24 hours after your surgery.     A parent or legal guardian must accompany a child scheduled for surgery and plan to stay at the hospital until the child is discharged.    Please do not bring other children with you.    For your comfort, please wear simple loose fitting clothing to the hospital.  Please do not bring valuables.    Do not wear any make-up or nail polish on  surgery.    C-Difficile admission screening and protocol:       * Admitted with diarrhea?                         [] YES    [x]  NO     *Prior history of C-Diff. In last 3 months? [] YES    [x]  NO     *Antibiotic use in the past 6-8 weeks?      []  NO    [x]  YES                 If yes, which ANTIBIOTIC AND REASON_cough_____     *Prior hospitalization or nursing home in the last month? []  YES    [x]  NO        SAFETY FIRST..call before you fall       Per Dr. Engel, ok to take lorazepam the morning of procedure with sip of water.

## 2024-09-05 ENCOUNTER — ANESTHESIA EVENT (OUTPATIENT)
Dept: ENDOSCOPY | Age: 89
End: 2024-09-05
Payer: MEDICARE

## 2024-09-06 ENCOUNTER — HOSPITAL ENCOUNTER (OUTPATIENT)
Age: 89
Setting detail: OUTPATIENT SURGERY
Discharge: HOME | End: 2024-09-06
Attending: INTERNAL MEDICINE | Admitting: INTERNAL MEDICINE
Payer: MEDICARE

## 2024-09-06 ENCOUNTER — ANESTHESIA (OUTPATIENT)
Dept: ENDOSCOPY | Age: 89
End: 2024-09-06
Payer: MEDICARE

## 2024-09-06 VITALS
TEMPERATURE: 97.4 F | HEIGHT: 59 IN | BODY MASS INDEX: 28 KG/M2 | RESPIRATION RATE: 16 BRPM | HEART RATE: 89 BPM | SYSTOLIC BLOOD PRESSURE: 138 MMHG | WEIGHT: 138.89 LBS | DIASTOLIC BLOOD PRESSURE: 101 MMHG | OXYGEN SATURATION: 99 %

## 2024-09-06 DIAGNOSIS — R13.19 ESOPHAGEAL DYSPHAGIA: ICD-10-CM

## 2024-09-06 PROCEDURE — 2500000003 HC RX 250 WO HCPCS

## 2024-09-06 PROCEDURE — 88305 TISSUE EXAM BY PATHOLOGIST: CPT

## 2024-09-06 PROCEDURE — 3700000001 HC ADD 15 MINUTES (ANESTHESIA): Performed by: INTERNAL MEDICINE

## 2024-09-06 PROCEDURE — 3609012400 HC EGD TRANSORAL BIOPSY SINGLE/MULTIPLE: Performed by: INTERNAL MEDICINE

## 2024-09-06 PROCEDURE — 3609015300 HC ESOPHAGEAL DILATION MALONEY: Performed by: INTERNAL MEDICINE

## 2024-09-06 PROCEDURE — 7100000010 HC PHASE II RECOVERY - FIRST 15 MIN: Performed by: INTERNAL MEDICINE

## 2024-09-06 PROCEDURE — 2580000003 HC RX 258: Performed by: ANESTHESIOLOGY

## 2024-09-06 PROCEDURE — 3700000000 HC ANESTHESIA ATTENDED CARE: Performed by: INTERNAL MEDICINE

## 2024-09-06 PROCEDURE — 7100000000 HC PACU RECOVERY - FIRST 15 MIN: Performed by: INTERNAL MEDICINE

## 2024-09-06 PROCEDURE — 2709999900 HC NON-CHARGEABLE SUPPLY: Performed by: INTERNAL MEDICINE

## 2024-09-06 PROCEDURE — 6360000002 HC RX W HCPCS

## 2024-09-06 PROCEDURE — 7100000011 HC PHASE II RECOVERY - ADDTL 15 MIN: Performed by: INTERNAL MEDICINE

## 2024-09-06 PROCEDURE — 7100000001 HC PACU RECOVERY - ADDTL 15 MIN: Performed by: INTERNAL MEDICINE

## 2024-09-06 RX ORDER — SODIUM CHLORIDE 0.9 % (FLUSH) 0.9 %
5-40 SYRINGE (ML) INJECTION PRN
Status: DISCONTINUED | OUTPATIENT
Start: 2024-09-06 | End: 2024-09-06 | Stop reason: HOSPADM

## 2024-09-06 RX ORDER — PROPOFOL 10 MG/ML
INJECTION, EMULSION INTRAVENOUS PRN
Status: DISCONTINUED | OUTPATIENT
Start: 2024-09-06 | End: 2024-09-06 | Stop reason: SDUPTHER

## 2024-09-06 RX ORDER — NALOXONE HYDROCHLORIDE 0.4 MG/ML
INJECTION, SOLUTION INTRAMUSCULAR; INTRAVENOUS; SUBCUTANEOUS PRN
Status: DISCONTINUED | OUTPATIENT
Start: 2024-09-06 | End: 2024-09-06 | Stop reason: HOSPADM

## 2024-09-06 RX ORDER — ONDANSETRON 2 MG/ML
4 INJECTION INTRAMUSCULAR; INTRAVENOUS
Status: DISCONTINUED | OUTPATIENT
Start: 2024-09-06 | End: 2024-09-06 | Stop reason: HOSPADM

## 2024-09-06 RX ORDER — SODIUM CHLORIDE 0.9 % (FLUSH) 0.9 %
5-40 SYRINGE (ML) INJECTION EVERY 12 HOURS SCHEDULED
Status: DISCONTINUED | OUTPATIENT
Start: 2024-09-06 | End: 2024-09-06 | Stop reason: HOSPADM

## 2024-09-06 RX ORDER — DIPHENHYDRAMINE HYDROCHLORIDE 50 MG/ML
12.5 INJECTION INTRAMUSCULAR; INTRAVENOUS
Status: DISCONTINUED | OUTPATIENT
Start: 2024-09-06 | End: 2024-09-06 | Stop reason: HOSPADM

## 2024-09-06 RX ORDER — GLYCOPYRROLATE 0.2 MG/ML
INJECTION INTRAMUSCULAR; INTRAVENOUS PRN
Status: DISCONTINUED | OUTPATIENT
Start: 2024-09-06 | End: 2024-09-06 | Stop reason: SDUPTHER

## 2024-09-06 RX ORDER — LIDOCAINE HYDROCHLORIDE 20 MG/ML
INJECTION, SOLUTION EPIDURAL; INFILTRATION; INTRACAUDAL; PERINEURAL PRN
Status: DISCONTINUED | OUTPATIENT
Start: 2024-09-06 | End: 2024-09-06 | Stop reason: SDUPTHER

## 2024-09-06 RX ORDER — SODIUM CHLORIDE 9 MG/ML
INJECTION, SOLUTION INTRAVENOUS PRN
Status: DISCONTINUED | OUTPATIENT
Start: 2024-09-06 | End: 2024-09-06 | Stop reason: HOSPADM

## 2024-09-06 RX ADMIN — PROPOFOL 20 MG: 10 INJECTION, EMULSION INTRAVENOUS at 12:41

## 2024-09-06 RX ADMIN — PROPOFOL 20 MG: 10 INJECTION, EMULSION INTRAVENOUS at 12:36

## 2024-09-06 RX ADMIN — GLYCOPYRROLATE 0.1 MG: 0.2 INJECTION INTRAMUSCULAR; INTRAVENOUS at 12:35

## 2024-09-06 RX ADMIN — PROPOFOL 20 MG: 10 INJECTION, EMULSION INTRAVENOUS at 12:43

## 2024-09-06 RX ADMIN — SODIUM CHLORIDE: 9 INJECTION, SOLUTION INTRAVENOUS at 11:07

## 2024-09-06 RX ADMIN — PROPOFOL 20 MG: 10 INJECTION, EMULSION INTRAVENOUS at 12:37

## 2024-09-06 RX ADMIN — PROPOFOL 50 MG: 10 INJECTION, EMULSION INTRAVENOUS at 12:35

## 2024-09-06 RX ADMIN — LIDOCAINE HYDROCHLORIDE 50 MG: 20 INJECTION, SOLUTION EPIDURAL; INFILTRATION; INTRACAUDAL; PERINEURAL at 12:35

## 2024-09-06 RX ADMIN — PROPOFOL 20 MG: 10 INJECTION, EMULSION INTRAVENOUS at 12:39

## 2024-09-06 ASSESSMENT — PAIN - FUNCTIONAL ASSESSMENT
PAIN_FUNCTIONAL_ASSESSMENT: 0-10
PAIN_FUNCTIONAL_ASSESSMENT: 0-10

## 2024-09-06 ASSESSMENT — LIFESTYLE VARIABLES: SMOKING_STATUS: 0

## 2024-09-06 NOTE — ANESTHESIA POSTPROCEDURE EVALUATION
Department of Anesthesiology  Postprocedure Note    Patient: Joellen Whittington  MRN: [id number]  YOB: 1935  Date of evaluation: 9/6/2024    Procedure Summary       Date: 09/06/24 Room / Location: [address]    Anesthesia Start: 1230 Anesthesia Stop: 1250    Procedures:       ESOPHAGOGASTRODUODENOSCOPY BIOPSY      ESOPHAGEAL DILATION ARIANNA Diagnosis:       Esophageal dysphagia      (Esophageal dysphagia [R13.19])    Surgeons: Yemi Salinas MD Responsible Provider: Sid Engel MD    Anesthesia Type: MAC ASA Status: 3            Anesthesia Type: No value filed.    Darian Phase I: Darian Score: 10    Darian Phase II: Darian Score: 10    Anesthesia Post Evaluation    Patient location during evaluation: bedside  Patient participation: complete - patient participated  Level of consciousness: awake and alert  Pain score: 0  Nausea & Vomiting: no nausea  Cardiovascular status: hemodynamically stable  Respiratory status: acceptable  Hydration status: stable  Pain management: adequate    No notable events documented.

## 2024-09-06 NOTE — H&P
Gastroenteroloy   Attending Pre-operative History and Physical      PROCEDURE:  EGd    Indication:The patient is a 89 y.o. female presents for an upper endoscopy.    Past Medical History:    Past Medical History:   Diagnosis Date    Anesthesia complication     states under general anesthesia has trouble awaking    Anxiety     Arthritis     fibramyalgia, osteo, DJD    Asthma     Bowel trouble     \"lazy bowel\"    Cancer (HCC)     skin cancer removed from left arm, back and right ear    CKD (chronic kidney disease)     CVA (cerebral vascular accident) (HCC)     x3 mini strokes    GERD (gastroesophageal reflux disease)     Hypertension     Kidney stone     Nausea & vomiting     Prolonged emergence from general anesthesia     Psoriasis     scalp, front of legs and back of arms    Unspecified cerebral artery occlusion with cerebral infarction 5 years ago    several TIA      Past Surgical History:    Past Surgical History:   Procedure Laterality Date    BUNIONECTOMY Right     Foot numb.  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  No brain involvement    SIGMOIDOSCOPY N/A 03/24/2023    SIGMOIDOSCOPY POLYP SNARE performed by Yemi Salinas MD at [facility] ENDOSCOPY    SKIN CANCER EXCISION Left 01/01/2014      Medications Prior to Admission:   Prior to Admission medications    Medication Sig Start Date End Date Taking? Authorizing Provider   losartan-hydroCHLOROthiazide (HYZAAR) 100-12.5 MG per tablet Take 0.5 tablets by mouth in the morning and at bedtime 7/31/24  Yes Provider, MD Kale   LORazepam (ATIVAN) 0.5 MG tablet Take 1 tablet by mouth every 6  hours as needed for Anxiety.   Yes Kale Fierro MD   Polyethylene Glycol 3350 4 g PACK as directed    Kale Fierro MD   Aspirin-Acetaminophen-Caffeine (EXCEDRIN PO) Take by mouth Daily 1 -2 times per day    Kale Fierro MD   docusate sodium (COLACE) 100 MG capsule Take 1 capsule by mouth every morning 3 pills everynight    Kale Fierro MD   zolpidem (AMBIEN) 5 MG tablet Take 1 tablet by mouth nightly.    Kale Fierro MD   omeprazole (PRILOSEC) 40 MG delayed release capsule take one capsule by mouth daily 1/15/19   Kale Fierro MD   vitamin D (ERGOCALCIFEROL) 37721 units CAPS capsule Take 1 capsule by mouth once a week Friday 4/20/18   Kale Fierro MD   albuterol sulfate  (90 Base) MCG/ACT inhaler Inhale 2 puffs into the lungs every 6 hours as needed for Shortness of Breath 2/26/18   Kale Fierro MD   Multiple Vitamins-Minerals (THERAPEUTIC MULTIVITAMIN-MINERALS) tablet Take 1 tablet by mouth daily    Kale Fierro MD        Allergies:  Other, Prednisone, Nsaids, Sulfa antibiotics, Azithromycin, Hydrocodone-acetaminophen, Morphine, and Nitrofurantoin  History of allergic reaction to anesthesia:  No    Social History:   Social History     Socioeconomic History    Marital status:      Spouse name: Not on file    Number of children: Not on file    Years of education: Not on file    Highest education level: Not on file   Occupational History    Not on file   Tobacco Use    Smoking status: Former    Smokeless tobacco: Never    Tobacco comments:     smokes as a teenager quit when she was 30 years old   Vaping Use    Vaping status: Never Used   Substance and Sexual Activity    Alcohol use: No    Drug use: No    Sexual activity: Not Currently     Partners: Male   Other Topics Concern    Not on file   Social History Narrative    Not on file     Social Determinants of Health     Financial Resource Strain: Not on file   Food

## 2024-09-06 NOTE — ANESTHESIA PRE PROCEDURE
[facility] Department of Anesthesiology  Pre-Anesthesia Evaluation/Consultation       Name:  Joellen Whittington  : 1935  Age:  89 y.o.                                           MRN:  [id number]  Date: 2024           Surgeon: Surgeon(s):  Yemi Salinas MD    Procedure: Procedure(s):  ESOPHAGOGASTRODUODENOSCOPY     Allergies   Allergen Reactions    Other Shortness Of Breath and Cough     Scented perfumes    Prednisone Palpitations    Nsaids      Kidney disease    Sulfa Antibiotics Hives and Other (See Comments)     Mouth sores  Other reaction(s): Unknown  Sore tongue    Azithromycin Rash    Hydrocodone-Acetaminophen Nausea And Vomiting     Hydrocodone    Morphine Nausea And Vomiting     Hallucination  with heavy dosage.     Nitrofurantoin Rash     Patient Active Problem List   Diagnosis    Transient ischemic attack    Chest pain     Past Medical History:   Diagnosis Date    Anesthesia complication     states under general anesthesia has trouble awaking    Anxiety     Arthritis     fibramyalgia, osteo, DJD    Asthma     Bowel trouble     \"lazy bowel\"    Cancer (HCC)     skin cancer removed from left arm, back and right ear    CKD (chronic kidney disease)     CVA (cerebral vascular accident) (HCC)     x3 mini strokes    GERD (gastroesophageal reflux disease)     Hypertension     Kidney stone     Nausea & vomiting     Prolonged emergence from general anesthesia     Psoriasis     scalp, front of legs and back of arms    Unspecified cerebral artery occlusion with cerebral infarction 5 years ago    several TIA     Past Surgical History:   Procedure Laterality Date    BUNIONECTOMY Right     Foot numb.  States MD \"had to cut nerves\"    ESOPHAGEAL DILATATION N/A 2023    ESOPHAGEAL DILATION KELLER performed by Yemi Salnias MD at [facility] ENDOSCOPY    EXAM UNDER GENERAL ANESTHESIA OF NASOPHARYNX      HAND SURGERY Right     related to arthritis    NOSE SURGERY  4 years ago    1) nasal fracture and

## 2024-09-06 NOTE — DISCHARGE INSTRUCTIONS
Endoscopy Discharge Instructions    [facility] with any questions or concerns.    You may be drowsy or lightheaded after receiving sedation.  DO NOT operate  a vehicle (automobile, bicycle, motorcycle, machinery, or power tools), no  alcoholic beverages, and do not make any important decisions today.                 Plan on bed rest or quiet relaxation today.  Resume normal activities in the morning.     Resume normal activity tomorrow unless otherwise advised by your physician.            Eat a light first meal, avoiding spicy and fatty foods, then resume normal diet unless  you are told otherwise by your physician.    If the intravenous medication site is painful, apply warm compresses on the site until the soreness is relieved and elevate the arm above the heart. Call your physician if no improvement  in 2-3 days.       POSSIBLE SYMPTOMS TO WATCH:     1. fever (greater than 100) 5. increased abdominal bloating   2. severe pain   6. excessive bleeding   3. nausea and vomiting  7. chest pain   4. chills    8. shortness of breath       [facility] if these problems occur     Expected as normal and remedies:  Sore throat: use over the counter throat lozenges or gargle with warm salt water.  Redness or soreness at the IV site: apply warm compress  Gaseous discomfort: belching or passing flatus (gas).    Check biopsy results.     Yemi Salinas MD    With questions or concerns call Dr Salinas at .

## 2024-09-06 NOTE — OP NOTE
Esophagogastroduodenoscopy Note    Patient:   Joellen Whittington    :    1935    Facility:   [facility] [Outpatient]   Referring/PCP: Amaury Fortune MD    Procedure:   Esophagogastroduodenoscopy   Date:     2024   Endoscopist:  Yemi Salinas MD     Preoperative Diagnosis: 89-year-old female presents for evaluation of symptoms of dysphagia, cough, burning sensation in the chest.      Postoperative Diagnosis:  1.  Normal esophageal mucosa  2.  Esophagus dilated with a 50 Sinhala Perez.  Shallow tear in the proximal esophagus post dilation.  3.  Small hiatal hernia  4.  Gastritis, biopsies obtained to rule out H. pylori infection.    Anesthesia: MAC    Estimated blood loss: Minimal    Complications: None    Specimen: Biopsy of gastric mucosa.    Instrument:     Description of Procedure:  Informed consent was obtained from the patient after explanation of the procedure including indications, description of the procedure,  benefits and possible risks and complications of the procedure, and alternatives. Questions were answered.  The patient's history was reviewed and a directed physical examination was performed prior to the procedure.    Patient was monitored throughout the procedure with pulse oximetry and periodic assessment of vital signs. Patient was sedated as noted above. With the patient in the left lateral decubitus position, the Olympus videoendoscope was placed in the patient's mouth and under direct visualization passed into the esophagus.  Visualization of the esophagus, stomach, and duodenum was performed during both introduction and withdrawal of the endoscope and retroflexed view of the proximal stomach was obtained. The scope was passed to the 2nd portion of the duodenum.  The patient tolerated the procedure well and was taken to the recovery area in good condition.    Findings: The mucosa of the esophagus is normal.  The

## 2024-10-29 ENCOUNTER — HOSPITAL ENCOUNTER (OUTPATIENT)
Dept: CT IMAGING | Age: 89
Discharge: HOME | End: 2024-10-29
Attending: INTERNAL MEDICINE
Payer: MEDICARE

## 2024-10-29 DIAGNOSIS — R10.30 LOWER ABDOMINAL PAIN: ICD-10-CM

## 2024-10-29 LAB
PERFORMED ON: NORMAL
POC CREATININE: 0.7 MG/DL (ref 0.6–1.2)
POC SAMPLE TYPE: NORMAL

## 2024-10-29 PROCEDURE — 74177 CT ABD & PELVIS W/CONTRAST: CPT

## 2024-10-29 PROCEDURE — 6360000004 HC RX CONTRAST MEDICATION: Performed by: INTERNAL MEDICINE

## 2024-10-29 PROCEDURE — 82565 ASSAY OF CREATININE: CPT

## 2024-10-29 RX ORDER — IOPAMIDOL 612 MG/ML
50 INJECTION, SOLUTION INTRAVASCULAR
Status: COMPLETED | OUTPATIENT
Start: 2024-10-29 | End: 2024-10-29

## 2024-10-29 RX ORDER — IOPAMIDOL 755 MG/ML
75 INJECTION, SOLUTION INTRAVASCULAR
Status: COMPLETED | OUTPATIENT
Start: 2024-10-29 | End: 2024-10-29

## 2024-10-29 RX ADMIN — IOPAMIDOL 50 ML: 612 INJECTION, SOLUTION INTRAVENOUS at 13:22

## 2024-10-29 RX ADMIN — IOPAMIDOL 75 ML: 755 INJECTION, SOLUTION INTRAVENOUS at 13:23

## 2024-11-17 ENCOUNTER — HOSPITAL ENCOUNTER (EMERGENCY)
Age: 89
Discharge: HOME | End: 2024-11-17
Attending: EMERGENCY MEDICINE
Payer: MEDICARE

## 2024-11-17 ENCOUNTER — APPOINTMENT (OUTPATIENT)
Dept: GENERAL RADIOLOGY | Age: 89
End: 2024-11-17
Payer: MEDICARE

## 2024-11-17 VITALS
HEIGHT: 59 IN | BODY MASS INDEX: 28.63 KG/M2 | RESPIRATION RATE: 16 BRPM | TEMPERATURE: 97.7 F | DIASTOLIC BLOOD PRESSURE: 91 MMHG | SYSTOLIC BLOOD PRESSURE: 155 MMHG | OXYGEN SATURATION: 95 % | HEART RATE: 60 BPM | WEIGHT: 142 LBS

## 2024-11-17 DIAGNOSIS — S62.660A CLOSED NONDISPLACED FRACTURE OF DISTAL PHALANX OF RIGHT INDEX FINGER, INITIAL ENCOUNTER: Primary | ICD-10-CM

## 2024-11-17 PROCEDURE — 73140 X-RAY EXAM OF FINGER(S): CPT

## 2024-11-17 PROCEDURE — 99283 EMERGENCY DEPT VISIT LOW MDM: CPT

## 2024-11-17 RX ORDER — HYDROCODONE BITARTRATE AND ACETAMINOPHEN 5; 325 MG/1; MG/1
0.5 TABLET ORAL EVERY 6 HOURS PRN
Qty: 6 TABLET | Refills: 0 | Status: SHIPPED | OUTPATIENT
Start: 2024-11-17 | End: 2024-11-20

## 2024-11-17 ASSESSMENT — LIFESTYLE VARIABLES
HOW MANY STANDARD DRINKS CONTAINING ALCOHOL DO YOU HAVE ON A TYPICAL DAY: PATIENT DOES NOT DRINK
HOW MANY STANDARD DRINKS CONTAINING ALCOHOL DO YOU HAVE ON A TYPICAL DAY: PATIENT DOES NOT DRINK
HOW OFTEN DO YOU HAVE A DRINK CONTAINING ALCOHOL: NEVER
HOW OFTEN DO YOU HAVE A DRINK CONTAINING ALCOHOL: NEVER

## 2024-11-17 ASSESSMENT — PAIN - FUNCTIONAL ASSESSMENT: PAIN_FUNCTIONAL_ASSESSMENT: 0-10

## 2024-11-17 ASSESSMENT — PAIN SCALES - GENERAL: PAINLEVEL_OUTOF10: 9

## 2024-11-17 NOTE — ED PROVIDER NOTES
[facility]  eMERGENCY dEPARTMENT eNCOUnter      Pt Name: Joellen Whittington  MRN: [id number]  Birthdate 1935  Date of evaluation: 11/17/2024  Provider: Rohit Hou MD    CHIEF COMPLAINT       Chief Complaint   Patient presents with    Finger Injury     States she got her right index finger got caught in door of animal age @ 3 am.  Right index finger bruised and swollen distally     HISTORY OF PRESENT ILLNESS   (Location/Symptom, Timing/Onset, Context/Setting, Quality, Duration, Modifying Factors, Severity)  Note limiting factors.     History obtained from: the patient    Joellen Whittington is a 89 y.o. female who presents after injuring her right index finger.  Patient reports that she got her finger caught in the door of the dog crate that she was closing and this caused an injury to her finger.  Reports pain to her distal right index finger.  Denies any injury to any other kitchen of her body.  Denies any laceration bleeding or skin.  Denies any numbness or weakness      HPI    Nursing Notes were reviewed.    REVIEW OFSYSTEMS    (2-9 systems for level 4, 10 or more for level 5)     Review of Systems   Constitutional:  Negative for appetite change and fever.   HENT:  Negative for trouble swallowing and voice change.    Eyes:  Negative for visual disturbance.   Respiratory:  Negative for shortness of breath.    Cardiovascular:  Negative for chest pain and palpitations.   Gastrointestinal:  Negative for diarrhea, nausea and vomiting.   Genitourinary:  Negative for dysuria.   Musculoskeletal:  Positive for arthralgias. Negative for gait problem.   Neurological:  Negative for seizures and syncope.   Psychiatric/Behavioral:  Negative for self-injury and suicidal ideas.        Except as noted above the remainder of the review of systems was reviewed and negative.       PAST MEDICAL HISTORY     Past Medical History:   Diagnosis Date    Anesthesia complication     states under

## (undated) DEVICE — ENDOSCOPY KIT: Brand: MEDLINE INDUSTRIES, INC.

## (undated) DEVICE — FORMALIN CLEAR VIAL 20 ML 10%

## (undated) DEVICE — BITE BLOCK ENDOSCP AD 60 FR W/ ADJ STRP PLAS GRN BLOX

## (undated) DEVICE — TRAP POLYP ETRAP

## (undated) DEVICE — SNARES COLD OVAL 10MM THIN

## (undated) DEVICE — FORCEPS BX 240CM 2.4MM L NDL RAD JAW 4 M00513334